# Patient Record
Sex: FEMALE | Race: WHITE | NOT HISPANIC OR LATINO | Employment: OTHER | URBAN - METROPOLITAN AREA
[De-identification: names, ages, dates, MRNs, and addresses within clinical notes are randomized per-mention and may not be internally consistent; named-entity substitution may affect disease eponyms.]

---

## 2023-10-11 ENCOUNTER — TELEPHONE (OUTPATIENT)
Dept: RHEUMATOLOGY | Facility: MEDICAL CENTER | Age: 78
End: 2023-10-11

## 2024-03-25 ENCOUNTER — OFFICE VISIT (OUTPATIENT)
Dept: MEDICAL GROUP | Facility: PHYSICIAN GROUP | Age: 79
End: 2024-03-25
Payer: MEDICARE

## 2024-03-25 VITALS
OXYGEN SATURATION: 90 % | WEIGHT: 207.89 LBS | RESPIRATION RATE: 14 BRPM | TEMPERATURE: 97 F | HEART RATE: 71 BPM | DIASTOLIC BLOOD PRESSURE: 72 MMHG | HEIGHT: 70 IN | SYSTOLIC BLOOD PRESSURE: 106 MMHG | BODY MASS INDEX: 29.76 KG/M2

## 2024-03-25 DIAGNOSIS — E03.9 HYPOTHYROIDISM, UNSPECIFIED TYPE: ICD-10-CM

## 2024-03-25 DIAGNOSIS — S33.100A SUBLUXATION OF LUMBAR VERTEBRA, INITIAL ENCOUNTER: ICD-10-CM

## 2024-03-25 DIAGNOSIS — Z85.828 HX OF NONMELANOMA SKIN CANCER: ICD-10-CM

## 2024-03-25 DIAGNOSIS — M54.42 ACUTE LEFT-SIDED LOW BACK PAIN WITH LEFT-SIDED SCIATICA: ICD-10-CM

## 2024-03-25 DIAGNOSIS — K64.8 OTHER HEMORRHOIDS: ICD-10-CM

## 2024-03-25 DIAGNOSIS — E55.9 VITAMIN D DEFICIENCY: ICD-10-CM

## 2024-03-25 DIAGNOSIS — I50.32 CHRONIC HEART FAILURE WITH PRESERVED EJECTION FRACTION (HCC): ICD-10-CM

## 2024-03-25 DIAGNOSIS — M16.12 ARTHRITIS OF LEFT HIP: ICD-10-CM

## 2024-03-25 DIAGNOSIS — Z23 NEED FOR VACCINATION: ICD-10-CM

## 2024-03-25 DIAGNOSIS — Z78.0 POST-MENOPAUSAL: ICD-10-CM

## 2024-03-25 DIAGNOSIS — M25.552 POSTERIOR PAIN OF LEFT HIP: ICD-10-CM

## 2024-03-25 DIAGNOSIS — I10 HYPERTENSION: ICD-10-CM

## 2024-03-25 DIAGNOSIS — E11.9 TYPE 2 DIABETES MELLITUS WITHOUT COMPLICATION, WITHOUT LONG-TERM CURRENT USE OF INSULIN (HCC): ICD-10-CM

## 2024-03-25 DIAGNOSIS — E78.5 HYPERLIPIDEMIA: ICD-10-CM

## 2024-03-25 PROCEDURE — 90677 PCV20 VACCINE IM: CPT | Performed by: STUDENT IN AN ORGANIZED HEALTH CARE EDUCATION/TRAINING PROGRAM

## 2024-03-25 PROCEDURE — 90662 IIV NO PRSV INCREASED AG IM: CPT | Performed by: STUDENT IN AN ORGANIZED HEALTH CARE EDUCATION/TRAINING PROGRAM

## 2024-03-25 PROCEDURE — G0009 ADMIN PNEUMOCOCCAL VACCINE: HCPCS | Performed by: STUDENT IN AN ORGANIZED HEALTH CARE EDUCATION/TRAINING PROGRAM

## 2024-03-25 PROCEDURE — 3074F SYST BP LT 130 MM HG: CPT | Performed by: STUDENT IN AN ORGANIZED HEALTH CARE EDUCATION/TRAINING PROGRAM

## 2024-03-25 PROCEDURE — 99204 OFFICE O/P NEW MOD 45 MIN: CPT | Mod: 25 | Performed by: STUDENT IN AN ORGANIZED HEALTH CARE EDUCATION/TRAINING PROGRAM

## 2024-03-25 PROCEDURE — 3078F DIAST BP <80 MM HG: CPT | Performed by: STUDENT IN AN ORGANIZED HEALTH CARE EDUCATION/TRAINING PROGRAM

## 2024-03-25 PROCEDURE — G0008 ADMIN INFLUENZA VIRUS VAC: HCPCS | Performed by: STUDENT IN AN ORGANIZED HEALTH CARE EDUCATION/TRAINING PROGRAM

## 2024-03-25 RX ORDER — KETOCONAZOLE 20 MG/G
1 CREAM TOPICAL DAILY
COMMUNITY
Start: 2023-10-31

## 2024-03-25 RX ORDER — SPIRONOLACTONE 25 MG/1
12.5 TABLET ORAL DAILY
Qty: 90 TABLET | Refills: 3 | Status: SHIPPED | OUTPATIENT
Start: 2024-03-25

## 2024-03-25 RX ORDER — CLOBETASOL PROPIONATE 0.46 MG/ML
SOLUTION TOPICAL
COMMUNITY
Start: 2023-11-29 | End: 2024-03-25

## 2024-03-25 RX ORDER — LEVOTHYROXINE SODIUM 0.1 MG/1
100 TABLET ORAL DAILY
COMMUNITY
Start: 2024-03-25

## 2024-03-25 RX ORDER — ANTIOX #8/OM3/DHA/EPA/LUT/ZEAX 250-2.5 MG
2 CAPSULE ORAL DAILY
COMMUNITY

## 2024-03-25 RX ORDER — MINOCYCLINE HYDROCHLORIDE 50 MG/1
50 CAPSULE ORAL 2 TIMES DAILY
COMMUNITY
Start: 2024-01-16 | End: 2024-03-25

## 2024-03-25 RX ORDER — GLYBURIDE 1.25 MG/1
1.25 TABLET ORAL
COMMUNITY
Start: 2024-01-04 | End: 2025-01-03

## 2024-03-25 RX ORDER — POLYETHYLENE GLYCOL 3350 17 G/17G
17 POWDER, FOR SOLUTION ORAL DAILY
Qty: 72 EACH | Refills: 3 | Status: SHIPPED | OUTPATIENT
Start: 2024-03-25

## 2024-03-25 RX ORDER — PRAVASTATIN SODIUM 80 MG/1
80 TABLET ORAL DAILY
Qty: 100 TABLET | Refills: 1 | Status: SHIPPED | OUTPATIENT
Start: 2024-03-25

## 2024-03-25 RX ORDER — AMPICILLIN TRIHYDRATE 250 MG
500 CAPSULE ORAL DAILY
COMMUNITY

## 2024-03-25 ASSESSMENT — PATIENT HEALTH QUESTIONNAIRE - PHQ9: CLINICAL INTERPRETATION OF PHQ2 SCORE: 0

## 2024-03-25 NOTE — PROGRESS NOTES
Verbal Consent given for [employer] recording software    HISTORY OF PRESENT ILLNESS: Elissa is a pleasant 78 y.o. female, new  patient who presents today to discuss medical problems as listed below:    History of Present Illness  The patient is an 78-year-old female who is here to establish care.    . She moved here to [place] after her   on 02/10/2024. Her son lives in [place] and her daughter who lives in [facility] brought her today. She could not get into anybody in [place] for 3 months.    She was taking care of her  and all of a sudden, she started having severe pain in her hip . It started in her lower back and then went across her hip in a straight line. It then went down her leg and even through her ankles, but it has backed off from that. It has been mainly in her knees. It started about 2 months ago. She has a little bit of tingling when she bends forward. She does not kneel down at night because she has artificial knees. She has had loss of bladder and bowel control for the last 10 years, but it has been a little worse than it was. She wears a pad for protection for the last 15 years. She denies any fevers, nausea, or vomiting. She has been taking ibuprofen 3 times a day, which is the only thing that would cut the pain. She has not gone to the urgent care or the emergency room for this pain. She has not felt anything inside the hip joint. She fell out of bed in 2023. She does not think the pain is related to the fall. She did take a little bit of Tylenol, but she did not feel like it gave her the relief that the ibuprofen did. Before her  got sick, she was going to the pool and doing aerobic rib exercises 3 times a week.    She is taking glipizide. Her last A1c was down to 6.3 about 6 months ago. She checks her blood every day. It has been as low as 65 and 107, but usually it is around 130 to 140. She is not taking metformin.    She is taking levothyroxine 100 mcg a day.    She is  taking spironolactone 12.5 mg daily. She is not taking lisinopril. She denies any chest pain, shortness of breath, or swelling in her legs.    She is taking pravastatin 80 mg.    She is taking aspirin. She was told that her blood was too thick, so she was put on 2 of them.    She has hemorrhoids. She has been using Preparation H for the last 1m, but it is still pretty big. This is the first time she has had it in her whole life. She has had it for about a month. She is more constipated than ususal      She went to a nephrologist for her kidneys. She was told that her kidneys function was 50 percent. She was told that for an 78-year-old woman, that is normal. She was told that taking spironolactone could hurt her kidneys, but if she drinks a lot of water, it would not hurt. She is taking half a tablet.    Supplemental Information  She had skin cancer on her forehead that was removed. She had 2 skin precancerous moles rooted off on her back. She thinks she has 2 more right now. She has been seeing a cardiologist in [place]. When she first went to him, her heart was fibrillating. She has not had any fibrillations for quite a while. One time, she saw Dr. Daily, who took her completely off the spironolactone. She went home and had fibrillating 3 times that week. She went back on the spironolactone and it stopped. She sees a cardiologist once a year.   She grew up in [place], [place].   She is allergic to EGG WHITE.   She has not received her pneumonia, influenza, or shingles vaccines.       Current Outpatient Medications Ordered in Epic   Medication Sig Dispense Refill    Multiple Vitamins-Minerals (PRESERVISION AREDS 2) Cap Take 2 Capsules by mouth every day.      glyBURIDE (DIABETA) 1.25 MG tablet Take 1.25 mg by mouth.      ketoconazole (NIZORAL) 2 % Cream Apply 1 Application topically every day.      Cinnamon 500 MG Cap Take 500 mg by mouth every day.      levothyroxine (SYNTHROID) 100 MCG Tab Take 1 Tablet by  mouth every day.        pravastatin (PRAVACHOL) 80 MG tablet Take 1 Tablet by mouth every day. 100 Tablet 1    spironolactone (ALDACTONE) 25 MG Tab Take 0.5 Tablets by mouth every day. 90 Tablet 3    polyethylene glycol/lytes (MIRALAX) Pack Take 1 Packet by mouth every day. For constipation 72 Each 3    psyllium 25 % packet Take 1 Packet by mouth 2 times a day. 90 Each 1    oyster shell calcium (CALCIUM CARBONATE) 500 MG TABS Take 500 mg by mouth every day.        Daily Multiple Vitamins TABS Take 1 Tab by mouth every day.        docosahexanoic acid (FISH OIL) 1000 MG CAPS Take 1,000 mg by mouth 3 times a day, with meals.        vitamin D (CHOLECALCIFEROL) 1000 UNIT TABS Take  by mouth. TAKE AS DIRECTED.        No current Epic-ordered facility-administered medications on file.       Review of systems:  Per HPI    Patient Active Problem List    Diagnosis Date Noted    Chronic heart failure with preserved ejection fraction (HCC) 03/26/2024    Acute left-sided low back pain with left-sided sciatica 03/25/2024    Type 2 diabetes mellitus without complication, without long-term current use of insulin (HCC) 03/25/2024    Other hemorrhoids 03/25/2024    Hx of nonmelanoma skin cancer 03/25/2024    Cough 05/10/2012    Dyslipidemia 05/10/2012    HTN (hypertension) 05/10/2012    Palpitations 05/10/2012    Simple goiter 02/06/2012    Arthritis 11/23/2011    Exudative macular degeneration (HCC) 11/23/2011    Obesity 11/23/2011     Past Surgical History:   Procedure Laterality Date    HIP REPLACEMENT, TOTAL      HYSTERECTOMY, TOTAL ABDOMINAL      KNEE REPLACEMENT, TOTAL      OTHER      LAPAROSC SM INTEST LASER VAPORIZATION ENDOMETRIOTIC TISSUE.     Social History     Tobacco Use    Smoking status: Never    Smokeless tobacco: Never   Vaping Use    Vaping Use: Never used   Substance Use Topics    Alcohol use: Never      Family History   Problem Relation Age of Onset    Stroke Other      Current Outpatient Medications   Medication  "Sig Dispense Refill    Multiple Vitamins-Minerals (PRESERVISION AREDS 2) Cap Take 2 Capsules by mouth every day.      glyBURIDE (DIABETA) 1.25 MG tablet Take 1.25 mg by mouth.      ketoconazole (NIZORAL) 2 % Cream Apply 1 Application topically every day.      Cinnamon 500 MG Cap Take 500 mg by mouth every day.      levothyroxine (SYNTHROID) 100 MCG Tab Take 1 Tablet by mouth every day.        pravastatin (PRAVACHOL) 80 MG tablet Take 1 Tablet by mouth every day. 100 Tablet 1    spironolactone (ALDACTONE) 25 MG Tab Take 0.5 Tablets by mouth every day. 90 Tablet 3    polyethylene glycol/lytes (MIRALAX) Pack Take 1 Packet by mouth every day. For constipation 72 Each 3    psyllium 25 % packet Take 1 Packet by mouth 2 times a day. 90 Each 1    oyster shell calcium (CALCIUM CARBONATE) 500 MG TABS Take 500 mg by mouth every day.        Daily Multiple Vitamins TABS Take 1 Tab by mouth every day.        docosahexanoic acid (FISH OIL) 1000 MG CAPS Take 1,000 mg by mouth 3 times a day, with meals.        vitamin D (CHOLECALCIFEROL) 1000 UNIT TABS Take  by mouth. TAKE AS DIRECTED.        No current facility-administered medications for this visit.       Allergies:  Allergies   Allergen Reactions    Pcn [Penicillins]        Allergies, past medical history, past surgical history, family history, social history reviewed and updated.    Objective:    /72   Pulse 71   Temp 36.1 °C (97 °F) (Temporal)   Resp 14   Ht 1.778 m (5' 10\")   Wt 94.3 kg (207 lb 14.3 oz)   SpO2 90%   BMI 29.83 kg/m²    Body mass index is 29.83 kg/m².    Physical exam:  General: Normal appearance, no acute distress, not ill-appearing  HEENT: EOM intact, conjunctiva normal limits, negative right/left eye discharge.  Sclera anicteric  Cardiovascular: Normal rate and rhythm, no murmurs  Pulmonary: No respiratory distress, no wheezing, no rales, breath sounds normal.  Musculoskeletal: No edema bilaterally  L hip neg erum, neg fader test. Good hip " flexion.   Skin: Warm, dry, no lesions  Neurological: No focal deficits, normal gait  Psychiatric: Mood within normal limits    Assessment/Plan:    Assessment & Plan  1. Sciatica.  Hip exam wnl though she does complain of posterior hip pain I suspect this is pain from her lower back.   I will get x-rays.   Advised to dc ibuprofen as she is on spironolactone, asa. Advised to take tylenol   Referral for pt placed     2. Type 2 diabetes.  She is on glyburide 1.25 mg daily.   Labs ordered    3. Hypothyroidism.  She will continue levothyroxine 100 mcg.    4. Hypertension.  She is on spironolactone 12.5 mg daily. She will continue spironolactone.  Chronic stable cond    5. High cholesterol.  She will continue pravastatin 80 mg.    6. Hemorrhoids.  Advised on avoiding pressure, constipation  Rx metamucil and stool softener for constipation      7. Seasonal allergies.  She probably has seasonal allergies. I will prescribe a temporary medication.    8. Skin cancer.  I will refer her to a dermatologist.    9. Chronic heart failure.  She saw a cardiologist in [place] who said she has chronic heart failure.   I advised her to stop the asa for now for primary prevention    10. Health maintenance.  She will receive her pneumonia and influenza vaccines today. She will receive her shingles vaccine. I will order a bone density scan.    Follow-up  The patient will follow up in 1 month.       Problem List Items Addressed This Visit       Acute left-sided low back pain with left-sided sciatica    Relevant Orders    DX-LUMBAR SPINE-2 OR 3 VIEWS    Referral to Physical Therapy    Type 2 diabetes mellitus without complication, without long-term current use of insulin (HCC)    Relevant Medications    glyBURIDE (DIABETA) 1.25 MG tablet    Other Relevant Orders    HEMOGLOBIN A1C    Lipid Profile    MICROALBUMIN CREAT RATIO URINE    CBC WITHOUT DIFFERENTIAL    Comp Metabolic Panel    Other hemorrhoids    Relevant Medications    pravastatin  (PRAVACHOL) 80 MG tablet    spironolactone (ALDACTONE) 25 MG Tab    psyllium 25 % packet    Hx of nonmelanoma skin cancer    Relevant Orders    Referral to Dermatology    Chronic heart failure with preserved ejection fraction (HCC)     Per last cardiology note 1/24:  Echocardiogram December 2022    Interpretation Summary   Left ventricular size, shape, and systolic function appear to be within normal   limits.   There is mild concentric left ventricular hypertrophy by 2-D.   The left ventricular wall motion is normal.   Ejection fraction is visually estimated at 60-65%, consistent with normal   global systolic function.   No aortic regurgitation is present.   There is no mitral regurgitation noted.   No tricuspid regurgitation.   Pulmonary artery systolic pressure cannot be estimated from the available   tricuspid regurgitation Doppler.   Mildly dilated ascending aorta.   Compared to previous echocardiogram no significant change     Palpitations   Holter monitor June 2019.  Underlying rhythm was sinus rhythm with an average heart rate of 64 bpm, minimum of 48 bpm, maximum 108 bpm. There was PACs comprising 0.4% of total beats. There was no SVT. There was 1 beat of atrial tachycardia of 6 beats. 2 PVCs.          Relevant Medications    pravastatin (PRAVACHOL) 80 MG tablet    spironolactone (ALDACTONE) 25 MG Tab    Other Relevant Orders    REFERRAL TO CARDIOLOGY     Other Visit Diagnoses       Need for vaccination        Relevant Orders    INFLUENZA VACCINE, HIGH DOSE (65+ ONLY) (Completed)    Pneumococcal Conjugate Vaccine 20-Valent (6 wks+) (Completed)    Posterior pain of left hip        Relevant Orders    DX-HIP-UNILATERAL-W/O PELVIS-2/3 VIEWS LEFT    Referral to Physical Therapy    Hyperlipidemia        Relevant Medications    pravastatin (PRAVACHOL) 80 MG tablet    spironolactone (ALDACTONE) 25 MG Tab    Other Relevant Orders    Lipid Profile    Hypertension        Relevant Medications    pravastatin (PRAVACHOL)  80 MG tablet    spironolactone (ALDACTONE) 25 MG Tab    Vitamin D deficiency        Relevant Orders    VITAMIN D,25 HYDROXY (DEFICIENCY)    Hypothyroidism, unspecified type        Relevant Medications    levothyroxine (SYNTHROID) 100 MCG Tab    Other Relevant Orders    TSH    FREE THYROXINE    Post-menopausal        Relevant Orders    DS-BONE DENSITY STUDY (DEXA)          Patient here for a preventive medicine visit today and to establish care.  -Reviewed all past medical history, family history, social history    Return in about 4 weeks (around 4/22/2024) for symptoms.

## 2024-03-25 NOTE — LETTER
[employer]  Som White D.O.  [address]  [place] [address]  Fax: [phone]   Authorization for Release/Disclosure of   Protected Health Information   Name: KIMBERLY SONG : 1945 SSN: [id number]   Address: [address] [place] [address]   [place] [address] Phone:    [phone] (home)    I authorize the entity listed below to release/disclose the PHI below to:   [facility]/Som White D.O. and Som White D.O.   Provider or Entity Name:  [facility]   Address   City, State, [facility] Phone:      Fax:     Reason for request: continuity of care   Information to be released:    [  ] LAST COLONOSCOPY,  including any PATH REPORT and follow-up  [  ] LAST FIT/COLOGUARD RESULT [  ] LAST DEXA  [  ] LAST MAMMOGRAM  [  ] LAST PAP  [  ] LAST LABS [  ] RETINA EXAM REPORT  [  ] IMMUNIZATION RECORDS  [  x] Release all info      [  ] Check here and initial the line next to each item to release ALL health information INCLUDING  _____ Care and treatment for drug and / or alcohol abuse  _____ HIV testing, infection status, or AIDS  _____ Genetic Testing    DATES OF SERVICE OR TIME PERIOD TO BE DISCLOSED: _____________  I understand and acknowledge that:  * This Authorization may be revoked at any time by you in writing, except if your health information has already been used or disclosed.  * Your health information that will be used or disclosed as a result of you signing this authorization could be re-disclosed by the recipient. If this occurs, your re-disclosed health information may no longer be protected by State or Federal laws.  * You may refuse to sign this Authorization. Your refusal will not affect your ability to obtain treatment.  * This Authorization becomes effective upon signing and will  on (date) __________.      If no date is indicated, this Authorization will  one (1) year from the signature date.    Name: Kimberly Song  Signature: Date:   3/25/2024     PLEASE FAX REQUESTED  RECORDS BACK TO: [phone]

## 2024-03-26 ENCOUNTER — HOSPITAL ENCOUNTER (OUTPATIENT)
Dept: RADIOLOGY | Facility: MEDICAL CENTER | Age: 79
End: 2024-03-26
Attending: STUDENT IN AN ORGANIZED HEALTH CARE EDUCATION/TRAINING PROGRAM
Payer: MEDICARE

## 2024-03-26 DIAGNOSIS — M54.42 ACUTE LEFT-SIDED LOW BACK PAIN WITH LEFT-SIDED SCIATICA: ICD-10-CM

## 2024-03-26 DIAGNOSIS — M25.552 POSTERIOR PAIN OF LEFT HIP: ICD-10-CM

## 2024-03-26 PROBLEM — I50.32 CHRONIC HEART FAILURE WITH PRESERVED EJECTION FRACTION (HCC): Status: ACTIVE | Noted: 2024-03-26

## 2024-03-26 PROCEDURE — 72100 X-RAY EXAM L-S SPINE 2/3 VWS: CPT

## 2024-03-26 PROCEDURE — 73502 X-RAY EXAM HIP UNI 2-3 VIEWS: CPT | Mod: LT

## 2024-03-26 NOTE — ASSESSMENT & PLAN NOTE
Per last cardiology note 1/24:  Echocardiogram December 2022    Interpretation Summary   Left ventricular size, shape, and systolic function appear to be within normal   limits.   There is mild concentric left ventricular hypertrophy by 2-D.   The left ventricular wall motion is normal.   Ejection fraction is visually estimated at 60-65%, consistent with normal   global systolic function.   No aortic regurgitation is present.   There is no mitral regurgitation noted.   No tricuspid regurgitation.   Pulmonary artery systolic pressure cannot be estimated from the available   tricuspid regurgitation Doppler.   Mildly dilated ascending aorta.   Compared to previous echocardiogram no significant change     Palpitations   Holter monitor June 2019.  Underlying rhythm was sinus rhythm with an average heart rate of 64 bpm, minimum of 48 bpm, maximum 108 bpm. There was PACs comprising 0.4% of total beats. There was no SVT. There was 1 beat of atrial tachycardia of 6 beats. 2 PVCs.

## 2024-03-27 ENCOUNTER — HOSPITAL ENCOUNTER (OUTPATIENT)
Dept: LAB | Facility: MEDICAL CENTER | Age: 79
End: 2024-03-27
Attending: STUDENT IN AN ORGANIZED HEALTH CARE EDUCATION/TRAINING PROGRAM
Payer: MEDICARE

## 2024-03-27 DIAGNOSIS — E11.9 TYPE 2 DIABETES MELLITUS WITHOUT COMPLICATION, WITHOUT LONG-TERM CURRENT USE OF INSULIN (HCC): ICD-10-CM

## 2024-03-27 DIAGNOSIS — E03.9 HYPOTHYROIDISM, UNSPECIFIED TYPE: ICD-10-CM

## 2024-03-27 DIAGNOSIS — E78.5 HYPERLIPIDEMIA: ICD-10-CM

## 2024-03-27 DIAGNOSIS — E55.9 VITAMIN D DEFICIENCY: ICD-10-CM

## 2024-03-27 LAB
25(OH)D3 SERPL-MCNC: 43 NG/ML (ref 30–100)
ALBUMIN SERPL BCP-MCNC: 4.2 G/DL (ref 3.2–4.9)
ALBUMIN/GLOB SERPL: 1.7 G/DL
ALP SERPL-CCNC: 47 U/L (ref 30–99)
ALT SERPL-CCNC: 18 U/L (ref 2–50)
ANION GAP SERPL CALC-SCNC: 12 MMOL/L (ref 7–16)
AST SERPL-CCNC: 21 U/L (ref 12–45)
BILIRUB SERPL-MCNC: 0.4 MG/DL (ref 0.1–1.5)
BUN SERPL-MCNC: 26 MG/DL (ref 8–22)
CALCIUM ALBUM COR SERPL-MCNC: 8.9 MG/DL (ref 8.5–10.5)
CALCIUM SERPL-MCNC: 9.1 MG/DL (ref 8.5–10.5)
CHLORIDE SERPL-SCNC: 103 MMOL/L (ref 96–112)
CHOLEST SERPL-MCNC: 134 MG/DL (ref 100–199)
CO2 SERPL-SCNC: 25 MMOL/L (ref 20–33)
CREAT SERPL-MCNC: 1.06 MG/DL (ref 0.5–1.4)
CREAT UR-MCNC: 139.55 MG/DL
ERYTHROCYTE [DISTWIDTH] IN BLOOD BY AUTOMATED COUNT: 48.9 FL (ref 35.9–50)
EST. AVERAGE GLUCOSE BLD GHB EST-MCNC: 134 MG/DL
FASTING STATUS PATIENT QL REPORTED: NORMAL
GFR SERPLBLD CREATININE-BSD FMLA CKD-EPI: 53 ML/MIN/1.73 M 2
GLOBULIN SER CALC-MCNC: 2.5 G/DL (ref 1.9–3.5)
GLUCOSE SERPL-MCNC: 132 MG/DL (ref 65–99)
HBA1C MFR BLD: 6.3 % (ref 4–5.6)
HCT VFR BLD AUTO: 45.7 % (ref 37–47)
HDLC SERPL-MCNC: 56 MG/DL
HGB BLD-MCNC: 15.3 G/DL (ref 12–16)
LDLC SERPL CALC-MCNC: 66 MG/DL
MCH RBC QN AUTO: 32.2 PG (ref 27–33)
MCHC RBC AUTO-ENTMCNC: 33.5 G/DL (ref 32.2–35.5)
MCV RBC AUTO: 96.2 FL (ref 81.4–97.8)
MICROALBUMIN UR-MCNC: <1.2 MG/DL
MICROALBUMIN/CREAT UR: NORMAL MG/G (ref 0–30)
PLATELET # BLD AUTO: 292 K/UL (ref 164–446)
PMV BLD AUTO: 10.7 FL (ref 9–12.9)
POTASSIUM SERPL-SCNC: 4 MMOL/L (ref 3.6–5.5)
PROT SERPL-MCNC: 6.7 G/DL (ref 6–8.2)
RBC # BLD AUTO: 4.75 M/UL (ref 4.2–5.4)
SODIUM SERPL-SCNC: 140 MMOL/L (ref 135–145)
T4 FREE SERPL-MCNC: 1.37 NG/DL (ref 0.93–1.7)
TRIGL SERPL-MCNC: 62 MG/DL (ref 0–149)
TSH SERPL DL<=0.005 MIU/L-ACNC: 1.71 UIU/ML (ref 0.38–5.33)
WBC # BLD AUTO: 7.5 K/UL (ref 4.8–10.8)

## 2024-03-27 PROCEDURE — 82043 UR ALBUMIN QUANTITATIVE: CPT

## 2024-03-27 PROCEDURE — 80061 LIPID PANEL: CPT

## 2024-03-27 PROCEDURE — 36415 COLL VENOUS BLD VENIPUNCTURE: CPT

## 2024-03-27 PROCEDURE — 82306 VITAMIN D 25 HYDROXY: CPT

## 2024-03-27 PROCEDURE — 82570 ASSAY OF URINE CREATININE: CPT

## 2024-03-27 PROCEDURE — 80053 COMPREHEN METABOLIC PANEL: CPT

## 2024-03-27 PROCEDURE — 84439 ASSAY OF FREE THYROXINE: CPT

## 2024-03-27 PROCEDURE — 83036 HEMOGLOBIN GLYCOSYLATED A1C: CPT | Mod: GA

## 2024-03-27 PROCEDURE — 85027 COMPLETE CBC AUTOMATED: CPT

## 2024-03-27 PROCEDURE — 84443 ASSAY THYROID STIM HORMONE: CPT

## 2024-04-10 ENCOUNTER — HOSPITAL ENCOUNTER (OUTPATIENT)
Dept: RADIOLOGY | Facility: MEDICAL CENTER | Age: 79
End: 2024-04-10
Attending: STUDENT IN AN ORGANIZED HEALTH CARE EDUCATION/TRAINING PROGRAM
Payer: MEDICARE

## 2024-04-10 DIAGNOSIS — Z78.0 POST-MENOPAUSAL: ICD-10-CM

## 2024-04-10 PROCEDURE — 77080 DXA BONE DENSITY AXIAL: CPT

## 2024-04-15 ENCOUNTER — HOSPITAL ENCOUNTER (OUTPATIENT)
Dept: RADIOLOGY | Facility: MEDICAL CENTER | Age: 79
End: 2024-04-15
Payer: MEDICARE

## 2024-04-15 ENCOUNTER — OFFICE VISIT (OUTPATIENT)
Dept: URGENT CARE | Facility: PHYSICIAN GROUP | Age: 79
End: 2024-04-15
Payer: MEDICARE

## 2024-04-15 VITALS
WEIGHT: 210 LBS | RESPIRATION RATE: 14 BRPM | HEIGHT: 70 IN | DIASTOLIC BLOOD PRESSURE: 76 MMHG | OXYGEN SATURATION: 90 % | BODY MASS INDEX: 30.06 KG/M2 | SYSTOLIC BLOOD PRESSURE: 112 MMHG | TEMPERATURE: 98.6 F | HEART RATE: 94 BPM

## 2024-04-15 DIAGNOSIS — B96.89 ACUTE BACTERIAL SINUSITIS: ICD-10-CM

## 2024-04-15 DIAGNOSIS — R05.3 CHRONIC COUGH: ICD-10-CM

## 2024-04-15 DIAGNOSIS — J01.90 ACUTE BACTERIAL SINUSITIS: ICD-10-CM

## 2024-04-15 PROCEDURE — 3078F DIAST BP <80 MM HG: CPT

## 2024-04-15 PROCEDURE — 3074F SYST BP LT 130 MM HG: CPT

## 2024-04-15 PROCEDURE — 99213 OFFICE O/P EST LOW 20 MIN: CPT

## 2024-04-15 PROCEDURE — 71046 X-RAY EXAM CHEST 2 VIEWS: CPT

## 2024-04-15 RX ORDER — DOXYCYCLINE HYCLATE 100 MG
100 TABLET ORAL 2 TIMES DAILY
Qty: 14 TABLET | Refills: 0 | Status: SHIPPED | OUTPATIENT
Start: 2024-04-15 | End: 2024-04-22

## 2024-04-15 ASSESSMENT — FIBROSIS 4 INDEX: FIB4 SCORE: 1.36

## 2024-04-16 ASSESSMENT — ENCOUNTER SYMPTOMS
COUGH: 1
VOMITING: 0
NAUSEA: 0
CHILLS: 0
DIARRHEA: 0
PALPITATIONS: 0
SHORTNESS OF BREATH: 0
SPUTUM PRODUCTION: 1
FEVER: 0
ORTHOPNEA: 0
HEADACHES: 0

## 2024-04-16 NOTE — PROGRESS NOTES
CHIEF COMPLAINT  Chief Complaint   Patient presents with    Sinusitis     Cough and mucus      Subjective:   Elissa Baldwin is a 78 y.o. female who presents with 2-month history of persistent cough and sinus pressure.  Patient reports 2-month history of productive nasal congestion.  She does report that cough is productive with a green to yellow type mucus.  She also reports associated symptoms of mild sore throat at illness onset which did resolve after gargling hydrogen peroxide.  Patient reports use of Claritin, Benadryl, NyQuil for alleviation of congestion.  She denies any symptoms of shortness of breath, but coughing fits often cause her to feel slightly fatigued.  Patient denies any symptoms of fever or chills.  She denies any other pertinent past medical history.      Review of Systems   Constitutional:  Negative for chills and fever.   HENT:  Positive for congestion.    Respiratory:  Positive for cough and sputum production. Negative for shortness of breath.    Cardiovascular:  Negative for chest pain, palpitations and orthopnea.   Gastrointestinal:  Negative for diarrhea, nausea and vomiting.   Neurological:  Negative for headaches.       PAST MEDICAL HISTORY  Patient Active Problem List    Diagnosis Date Noted    Chronic heart failure with preserved ejection fraction (HCC) 03/26/2024    Acute left-sided low back pain with left-sided sciatica 03/25/2024    Type 2 diabetes mellitus without complication, without long-term current use of insulin (HCC) 03/25/2024    Other hemorrhoids 03/25/2024    Hx of nonmelanoma skin cancer 03/25/2024    Cough 05/10/2012    Dyslipidemia 05/10/2012    HTN (hypertension) 05/10/2012    Palpitations 05/10/2012    Simple goiter 02/06/2012    Arthritis 11/23/2011    Exudative macular degeneration (HCC) 11/23/2011    Obesity 11/23/2011       SURGICAL HISTORY   has a past surgical history that includes hysterectomy, total abdominal; knee replacement, total; hip replacement,  "total; and other.    ALLERGIES  Allergies   Allergen Reactions    Pcn [Penicillins]        CURRENT MEDICATIONS  Home Medications    **Home medications have not yet been reviewed for this encounter**         SOCIAL HISTORY  Social History     Tobacco Use    Smoking status: Never    Smokeless tobacco: Never   Vaping Use    Vaping Use: Never used   Substance and Sexual Activity    Alcohol use: Never    Drug use: Never    Sexual activity: Not on file       FAMILY HISTORY  Family History   Problem Relation Age of Onset    Stroke Other          Medications, Allergies, and current problem list reviewed today in Epic.     Objective:     /76   Pulse 94   Temp 37 °C (98.6 °F)   Resp 14   Ht 1.778 m (5' 10\")   Wt 95.3 kg (210 lb)   SpO2 90%     Physical Exam  Vitals reviewed.   Constitutional:       General: She is not in acute distress.     Appearance: Normal appearance. She is not ill-appearing or toxic-appearing.   HENT:      Head: Normocephalic.      Right Ear: Tympanic membrane normal.      Left Ear: Tympanic membrane normal.      Nose: Congestion present.      Mouth/Throat:      Mouth: Mucous membranes are moist.      Pharynx: Oropharynx is clear. No oropharyngeal exudate or posterior oropharyngeal erythema.   Cardiovascular:      Rate and Rhythm: Normal rate and regular rhythm.      Pulses: Normal pulses.      Heart sounds: Normal heart sounds.   Pulmonary:      Effort: Pulmonary effort is normal. No respiratory distress.      Breath sounds: Normal breath sounds. No stridor. No wheezing, rhonchi or rales.   Musculoskeletal:      Cervical back: Normal range of motion. No rigidity or tenderness.   Lymphadenopathy:      Cervical: No cervical adenopathy.   Skin:     General: Skin is warm.      Capillary Refill: Capillary refill takes less than 2 seconds.   Neurological:      General: No focal deficit present.      Mental Status: She is alert.   Psychiatric:         Mood and Affect: Mood normal.            "   Assessment/Plan:     Diagnosis and associated orders:     1. Chronic cough  DX-CHEST-2 VIEWS      2. Acute bacterial sinusitis  doxycycline (VIBRAMYCIN) 100 MG Tab         Comments/MDM:     Upon physical exam patient is alert and apparent signs of distress.  Bilateral TMs are normal.  Mild congestion appreciated.  Mucous membranes moist and clear.  No pharyngeal erythema or swelling.  Neck is supple, no lymphadenopathy.  Chest clear to auscultation bilaterally.  No crackles, rhonchi or wheezes appreciated.  Normal respiratory effort.  Vital signs are stable in clinic.  Chest x-ray performed in clinic today.  Discussed reassuring radiologic results.  X-ray showed no acute cardiopulmonary process.  Given patient reports of symptom duration as well as sinus pain and pressure and persistent nasal congestion discussed potential etiology of bacterial sinus infection.  Patient meets IDSA guidelines for ABRS given duration of symptoms, worsening severity, clinical history and physical exam  Continue antihistamine, nasal steroid, and OTC analgesics  No evidence of venous sinus thrombosis or more serious etiology  Typically these infections display a slow resolution of symptoms starting at 48-72 hours of treatment.  Mild pressure and pain may continue at end of week of antibiotics which is normal and continue the other supportive care until back to baseline.   Red flag signs and symptoms discussed.  Instructed to return to ER or urgent care if symptoms worsen or fail to improve.         Differential diagnosis, natural history, supportive care, and indications for immediate follow-up discussed.    Advised the patient to follow-up with the primary care physician for recheck, reevaluation, and consideration of further management.    Please note that this dictation was created using voice recognition software. I have made a reasonable attempt to correct obvious errors, but I expect that there are errors of grammar and possibly  content that I did not discover before finalizing the note.    This note was electronically signed by VICK Serra

## 2024-04-22 ENCOUNTER — TELEPHONE (OUTPATIENT)
Dept: HEALTH INFORMATION MANAGEMENT | Facility: OTHER | Age: 79
End: 2024-04-22
Payer: MEDICARE

## 2024-04-30 ENCOUNTER — APPOINTMENT (OUTPATIENT)
Dept: MEDICAL GROUP | Facility: PHYSICIAN GROUP | Age: 79
End: 2024-04-30
Payer: MEDICARE

## 2024-04-30 VITALS
SYSTOLIC BLOOD PRESSURE: 110 MMHG | HEART RATE: 67 BPM | RESPIRATION RATE: 14 BRPM | HEIGHT: 70 IN | TEMPERATURE: 98.7 F | BODY MASS INDEX: 30.08 KG/M2 | WEIGHT: 210.1 LBS | OXYGEN SATURATION: 93 % | DIASTOLIC BLOOD PRESSURE: 76 MMHG

## 2024-04-30 DIAGNOSIS — E11.9 TYPE 2 DIABETES MELLITUS WITHOUT COMPLICATION, WITHOUT LONG-TERM CURRENT USE OF INSULIN (HCC): ICD-10-CM

## 2024-04-30 DIAGNOSIS — N18.31 STAGE 3A CHRONIC KIDNEY DISEASE: ICD-10-CM

## 2024-04-30 PROCEDURE — 3074F SYST BP LT 130 MM HG: CPT | Performed by: STUDENT IN AN ORGANIZED HEALTH CARE EDUCATION/TRAINING PROGRAM

## 2024-04-30 PROCEDURE — 3078F DIAST BP <80 MM HG: CPT | Performed by: STUDENT IN AN ORGANIZED HEALTH CARE EDUCATION/TRAINING PROGRAM

## 2024-04-30 PROCEDURE — 99214 OFFICE O/P EST MOD 30 MIN: CPT | Performed by: STUDENT IN AN ORGANIZED HEALTH CARE EDUCATION/TRAINING PROGRAM

## 2024-04-30 RX ORDER — LISINOPRIL 20 MG/1
20 TABLET ORAL DAILY
COMMUNITY
End: 2024-04-30

## 2024-04-30 RX ORDER — FLUTICASONE PROPIONATE 50 MCG
SPRAY, SUSPENSION (ML) NASAL
COMMUNITY

## 2024-04-30 RX ORDER — GABAPENTIN 300 MG/1
CAPSULE ORAL
COMMUNITY

## 2024-04-30 RX ORDER — ASPIRIN 81 MG/1
162 TABLET ORAL DAILY
COMMUNITY

## 2024-04-30 RX ORDER — METFORMIN HYDROCHLORIDE 500 MG/1
500 TABLET, EXTENDED RELEASE ORAL DAILY
Qty: 90 TABLET | Refills: 1 | Status: SHIPPED | OUTPATIENT
Start: 2024-04-30 | End: 2024-05-02 | Stop reason: SDUPTHER

## 2024-04-30 RX ORDER — TRAMADOL HYDROCHLORIDE 50 MG/1
TABLET ORAL
COMMUNITY
Start: 2024-04-15

## 2024-04-30 ASSESSMENT — FIBROSIS 4 INDEX: FIB4 SCORE: 1.36

## 2024-04-30 NOTE — LETTER
[employer]  Som White D.O.  [address]  [place] [address]  Fax: [phone]   Authorization for Release/Disclosure of   Protected Health Information   Name: KIMBERLY SONG : 1945 SSN: [id number]   Address: [address] [place] [address] [place] [address] Phone:    [phone] (home)    I authorize the entity listed below to release/disclose the PHI below to:   [employer]/Som White D.O. and Som White D.O.   Provider or Entity Name:  [facility]   City, State, [facility] Phone:      Fax:     Reason for request: continuity of care   Information to be released:    [  ] LAST COLONOSCOPY,  including any PATH REPORT and follow-up  [  ] LAST FIT/COLOGUARD RESULT [  ] LAST DEXA  [  ] LAST MAMMOGRAM  [  ] LAST PAP  [  ] LAST LABS [ x ] RETINA EXAM REPORT  [  ] IMMUNIZATION RECORDS  [  ] Release all info      [  ] Check here and initial the line next to each item to release ALL health information INCLUDING  _____ Care and treatment for drug and / or alcohol abuse  _____ HIV testing, infection status, or AIDS  _____ Genetic Testing    DATES OF SERVICE OR TIME PERIOD TO BE DISCLOSED: _____________  I understand and acknowledge that:  * This Authorization may be revoked at any time by you in writing, except if your health information has already been used or disclosed.  * Your health information that will be used or disclosed as a result of you signing this authorization could be re-disclosed by the recipient. If this occurs, your re-disclosed health information may no longer be protected by State or Federal laws.  * You may refuse to sign this Authorization. Your refusal will not affect your ability to obtain treatment.  * This Authorization becomes effective upon signing and will  on (date) __________.      If no date is indicated, this Authorization will  one (1) year from the signature date.    Name: Kimberly Song  Signature: Date:   2024      PLEASE FAX REQUESTED RECORDS BACK TO: [phone]

## 2024-05-01 NOTE — PROGRESS NOTES
Verbal Consent given for [employer] recording software    HISTORY OF PRESENT ILLNESS: Elissa is a pleasant 78 y.o. female, established patient who presents today to discuss medical problems as listed below:    History of Present Illness  The patient is here for a follow-up. At the last visit, she was having a lot of left lower back and hip pain. We did a couple of upper x-rays.    The patient is scheduled for an MRI of her back and hipShe was referred to [facility] for [facility] therapy, however, her appointment is not until 07/2024.     Her daughter-in-law is contemplating water exercises for her. She finds relief from her pain when she is in a supine position. Her hip pain, which began in 01/2024, coincided with her caregiving responsibilities for her . 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 Her current pain management regimen includes 2 Tylenol tablets every 4 hours.    The patient is currently on glyburide 1.25 mg daily for her diabetes. She previously took metformin, but the reason for its discontinuation remains unknown to her.       Supplemental Information  She is having injections in her eyes at [facility].       Current Outpatient Medications Ordered in Epic   Medication Sig Dispense Refill    fluticasone (FLONASE) 50 MCG/ACT nasal spray       gabapentin (NEURONTIN) 300 MG Cap       metFORMIN ER (GLUCOPHAGE XR) 500 MG TABLET SR 24 HR Take 1 Tablet by mouth every day. 90 Tablet 1    Multiple Vitamins-Minerals (PRESERVISION AREDS 2) Cap Take 2 Capsules by mouth every day.      ketoconazole (NIZORAL) 2 % Cream Apply 1 Application topically every day.      Cinnamon 500 MG Cap Take 500 mg by mouth every day.      levothyroxine (SYNTHROID) 100 MCG Tab Take 1 Tablet by mouth every day.        pravastatin (PRAVACHOL) 80 MG tablet Take 1 Tablet by mouth every day. 100 Tablet 1    spironolactone (ALDACTONE) 25 MG Tab Take 0.5 Tablets by mouth every day. 90 Tablet 3    polyethylene  glycol/lytes (MIRALAX) Pack Take 1 Packet by mouth every day. For constipation 72 Each 3    psyllium 25 % packet Take 1 Packet by mouth 2 times a day. 90 Each 1    oyster shell calcium (CALCIUM CARBONATE) 500 MG TABS Take 500 mg by mouth every day.        Daily Multiple Vitamins TABS Take 1 Tab by mouth every day.        docosahexanoic acid (FISH OIL) 1000 MG CAPS Take 1,000 mg by mouth 3 times a day, with meals.        vitamin D (CHOLECALCIFEROL) 1000 UNIT TABS Take  by mouth. TAKE AS DIRECTED.       traMADol (ULTRAM) 50 MG Tab  (Patient not taking: Reported on 4/30/2024)      aspirin 81 MG EC tablet Take 162 mg by mouth every day. (Patient not taking: Reported on 4/30/2024)       No current [facility]-ordered facility-administered medications on file.       Review of systems:  Per HPI    Patient Active Problem List    Diagnosis Date Noted    Stage 3a chronic kidney disease 04/30/2024    Chronic heart failure with preserved ejection fraction (HCC) 03/26/2024    Acute left-sided low back pain with left-sided sciatica 03/25/2024    Type 2 diabetes mellitus without complication, without long-term current use of insulin (HCC) 03/25/2024    Other hemorrhoids 03/25/2024    Hx of nonmelanoma skin cancer 03/25/2024    Cough 05/10/2012    Dyslipidemia 05/10/2012    Palpitations 05/10/2012    Simple goiter 02/06/2012    Arthritis 11/23/2011    Exudative macular degeneration (HCC) 11/23/2011    Obesity 11/23/2011     Past Surgical History:   Procedure Laterality Date    HIP REPLACEMENT, TOTAL      HYSTERECTOMY, TOTAL ABDOMINAL      KNEE REPLACEMENT, TOTAL      OTHER      LAPAROSC SM INTEST LASER VAPORIZATION ENDOMETRIOTIC TISSUE.     Social History     Tobacco Use    Smoking status: Never    Smokeless tobacco: Never   Vaping Use    Vaping Use: Never used   Substance Use Topics    Alcohol use: Never    Drug use: Never      Family History   Problem Relation Age of Onset    Stroke Other      Current Outpatient Medications   Medication  "Sig Dispense Refill    fluticasone (FLONASE) 50 MCG/ACT nasal spray       gabapentin (NEURONTIN) 300 MG Cap       metFORMIN ER (GLUCOPHAGE XR) 500 MG TABLET SR 24 HR Take 1 Tablet by mouth every day. 90 Tablet 1    Multiple Vitamins-Minerals (PRESERVISION AREDS 2) Cap Take 2 Capsules by mouth every day.      ketoconazole (NIZORAL) 2 % Cream Apply 1 Application topically every day.      Cinnamon 500 MG Cap Take 500 mg by mouth every day.      levothyroxine (SYNTHROID) 100 MCG Tab Take 1 Tablet by mouth every day.        pravastatin (PRAVACHOL) 80 MG tablet Take 1 Tablet by mouth every day. 100 Tablet 1    spironolactone (ALDACTONE) 25 MG Tab Take 0.5 Tablets by mouth every day. 90 Tablet 3    polyethylene glycol/lytes (MIRALAX) Pack Take 1 Packet by mouth every day. For constipation 72 Each 3    psyllium 25 % packet Take 1 Packet by mouth 2 times a day. 90 Each 1    oyster shell calcium (CALCIUM CARBONATE) 500 MG TABS Take 500 mg by mouth every day.        Daily Multiple Vitamins TABS Take 1 Tab by mouth every day.        docosahexanoic acid (FISH OIL) 1000 MG CAPS Take 1,000 mg by mouth 3 times a day, with meals.        vitamin D (CHOLECALCIFEROL) 1000 UNIT TABS Take  by mouth. TAKE AS DIRECTED.       traMADol (ULTRAM) 50 MG Tab  (Patient not taking: Reported on 4/30/2024)      aspirin 81 MG EC tablet Take 162 mg by mouth every day. (Patient not taking: Reported on 4/30/2024)       No current facility-administered medications for this visit.       Allergies:  Allergies   Allergen Reactions    Pcn [Penicillins]        Allergies, past medical history, past surgical history, family history, social history reviewed and updated.    Objective:    /76   Pulse 67   Temp 37.1 °C (98.7 °F) (Temporal)   Resp 14   Ht 1.778 m (5' 10\")   Wt 95.3 kg (210 lb 1.6 oz)   SpO2 93%   BMI 30.15 kg/m²    Body mass index is 30.15 kg/m².    Physical exam:  General: Normal appearance, no acute distress, not ill-appearing  HEENT: " EOM intact, conjunctiva normal limits, negative right/left eye discharge.  Sclera anicteric  Cardiovascular: Normal rate and rhythm, no murmurs  Pulmonary: No respiratory distress, no wheezing, no rales, breath sounds normal.  Musculoskeletal: No edema bilaterally  Skin: Warm, dry, no lesions  Neurological: No focal deficits, normal gait  Psychiatric: Mood within normal limits    Monofilament testing with a 10 gram force: sensation intact: decreased bilaterally  Visual Inspection: Feet without maceration, ulcers, fissures.  Pedal pulses: intact bilaterally      Assessment/Plan:    Assessment & Plan  # Left hip arthritis  # Degenerative subluxations L3-L4, L4-L5  Patient following up with orthopedics  Will be getting MRI done, physical therapy to start in July.  Advised that she could find another physical therapy office informing to place referral if she would like to be seen earlier    2.  Type II diabetes.  The patient's condition is well-managed, with her A1c currently at 6.3. Given the risk of hypoglycemia associated with glyburide, it is preferable for the patient to commence metformin therapy.    DC glipizide start metformin    Diabetic foot exam today shows some monofilament sensation loss    3. Hypothyroidism.  The patient's TSH and free T4 levels are within the normal range.       Problem List Items Addressed This Visit       Type 2 diabetes mellitus without complication, without long-term current use of insulin (HCC)    Relevant Medications    metFORMIN ER (GLUCOPHAGE XR) 500 MG TABLET SR 24 HR    Other Relevant Orders    Diabetic Monofilament LE Exam (Completed)    Stage 3a chronic kidney disease     Gfr baseline 51-55               Return in about 3 months (around 7/30/2024), or if symptoms worsen or fail to improve.

## 2024-05-02 RX ORDER — METFORMIN HYDROCHLORIDE 500 MG/1
500 TABLET, EXTENDED RELEASE ORAL DAILY
Qty: 90 TABLET | Refills: 1 | Status: SHIPPED | OUTPATIENT
Start: 2024-05-02 | End: 2024-05-20 | Stop reason: SDUPTHER

## 2024-05-20 NOTE — TELEPHONE ENCOUNTER
Received request via: Patient    Was the patient seen in the last year in this department? Yes    Does the patient have an active prescription (recently filled or refills available) for medication(s) requested? No    Pharmacy Name: [facility]     Does the patient have [facility] and need 100 day supply (blood pressure, diabetes and cholesterol meds only)? Patient does not have SCP

## 2024-05-21 RX ORDER — METFORMIN HYDROCHLORIDE 500 MG/1
500 TABLET, EXTENDED RELEASE ORAL DAILY
Qty: 90 TABLET | Refills: 1 | Status: SHIPPED | OUTPATIENT
Start: 2024-05-21

## 2024-06-22 ENCOUNTER — HOSPITAL ENCOUNTER (OUTPATIENT)
Dept: LAB | Facility: MEDICAL CENTER | Age: 79
End: 2024-06-22
Attending: ORTHOPAEDIC SURGERY
Payer: MEDICARE

## 2024-06-22 LAB
ANION GAP SERPL CALC-SCNC: 12 MMOL/L (ref 7–16)
BASOPHILS # BLD AUTO: 0.7 % (ref 0–1.8)
BASOPHILS # BLD: 0.07 K/UL (ref 0–0.12)
BUN SERPL-MCNC: 23 MG/DL (ref 8–22)
CALCIUM SERPL-MCNC: 9.6 MG/DL (ref 8.4–10.2)
CHLORIDE SERPL-SCNC: 100 MMOL/L (ref 96–112)
CO2 SERPL-SCNC: 26 MMOL/L (ref 20–33)
CREAT SERPL-MCNC: 1.06 MG/DL (ref 0.5–1.4)
EOSINOPHIL # BLD AUTO: 0.19 K/UL (ref 0–0.51)
EOSINOPHIL NFR BLD: 2 % (ref 0–6.9)
ERYTHROCYTE [DISTWIDTH] IN BLOOD BY AUTOMATED COUNT: 47 FL (ref 35.9–50)
FASTING STATUS PATIENT QL REPORTED: NORMAL
GFR SERPLBLD CREATININE-BSD FMLA CKD-EPI: 53 ML/MIN/1.73 M 2
GLUCOSE SERPL-MCNC: 127 MG/DL (ref 65–99)
HCT VFR BLD AUTO: 49.5 % (ref 37–47)
HGB BLD-MCNC: 16.9 G/DL (ref 12–16)
IMM GRANULOCYTES # BLD AUTO: 0.04 K/UL (ref 0–0.11)
IMM GRANULOCYTES NFR BLD AUTO: 0.4 % (ref 0–0.9)
LYMPHOCYTES # BLD AUTO: 2.4 K/UL (ref 1–4.8)
LYMPHOCYTES NFR BLD: 25.2 % (ref 22–41)
MCH RBC QN AUTO: 32.6 PG (ref 27–33)
MCHC RBC AUTO-ENTMCNC: 34.1 G/DL (ref 32.2–35.5)
MCV RBC AUTO: 95.6 FL (ref 81.4–97.8)
MONOCYTES # BLD AUTO: 0.65 K/UL (ref 0–0.85)
MONOCYTES NFR BLD AUTO: 6.8 % (ref 0–13.4)
NEUTROPHILS # BLD AUTO: 6.18 K/UL (ref 1.82–7.42)
NEUTROPHILS NFR BLD: 64.9 % (ref 44–72)
NRBC # BLD AUTO: 0 K/UL
NRBC BLD-RTO: 0 /100 WBC (ref 0–0.2)
PLATELET # BLD AUTO: 289 K/UL (ref 164–446)
PMV BLD AUTO: 9.7 FL (ref 9–12.9)
POTASSIUM SERPL-SCNC: 4.2 MMOL/L (ref 3.6–5.5)
RBC # BLD AUTO: 5.18 M/UL (ref 4.2–5.4)
SODIUM SERPL-SCNC: 138 MMOL/L (ref 135–145)
WBC # BLD AUTO: 9.5 K/UL (ref 4.8–10.8)

## 2024-06-22 PROCEDURE — 83036 HEMOGLOBIN GLYCOSYLATED A1C: CPT | Mod: GA

## 2024-06-22 PROCEDURE — 85025 COMPLETE CBC W/AUTO DIFF WBC: CPT

## 2024-06-22 PROCEDURE — 36415 COLL VENOUS BLD VENIPUNCTURE: CPT

## 2024-06-22 PROCEDURE — 80048 BASIC METABOLIC PNL TOTAL CA: CPT

## 2024-06-23 LAB
EST. AVERAGE GLUCOSE BLD GHB EST-MCNC: 140 MG/DL
HBA1C MFR BLD: 6.5 % (ref 4–5.6)

## 2024-07-11 ENCOUNTER — APPOINTMENT (OUTPATIENT)
Dept: PHYSICAL THERAPY | Facility: REHABILITATION | Age: 79
End: 2024-07-11
Attending: STUDENT IN AN ORGANIZED HEALTH CARE EDUCATION/TRAINING PROGRAM
Payer: MEDICARE

## 2024-07-18 ENCOUNTER — APPOINTMENT (OUTPATIENT)
Dept: PHYSICAL THERAPY | Facility: REHABILITATION | Age: 79
End: 2024-07-18
Attending: STUDENT IN AN ORGANIZED HEALTH CARE EDUCATION/TRAINING PROGRAM
Payer: MEDICARE

## 2024-07-25 ENCOUNTER — APPOINTMENT (OUTPATIENT)
Dept: PHYSICAL THERAPY | Facility: REHABILITATION | Age: 79
End: 2024-07-25
Attending: STUDENT IN AN ORGANIZED HEALTH CARE EDUCATION/TRAINING PROGRAM
Payer: MEDICARE

## 2024-07-29 ENCOUNTER — HOSPITAL ENCOUNTER (OUTPATIENT)
Dept: RADIOLOGY | Facility: MEDICAL CENTER | Age: 79
End: 2024-07-29
Payer: MEDICARE

## 2024-07-29 ENCOUNTER — HOSPITAL ENCOUNTER (EMERGENCY)
Facility: MEDICAL CENTER | Age: 79
End: 2024-07-30
Payer: MEDICARE

## 2024-07-29 ENCOUNTER — HOSPITAL ENCOUNTER (EMERGENCY)
Facility: MEDICAL CENTER | Age: 79
End: 2024-07-29
Attending: EMERGENCY MEDICINE
Payer: MEDICARE

## 2024-07-29 ENCOUNTER — OFFICE VISIT (OUTPATIENT)
Dept: URGENT CARE | Facility: PHYSICIAN GROUP | Age: 79
End: 2024-07-29
Payer: MEDICARE

## 2024-07-29 VITALS
DIASTOLIC BLOOD PRESSURE: 64 MMHG | RESPIRATION RATE: 16 BRPM | SYSTOLIC BLOOD PRESSURE: 130 MMHG | TEMPERATURE: 99 F | BODY MASS INDEX: 29.98 KG/M2 | HEART RATE: 81 BPM | HEIGHT: 70 IN | OXYGEN SATURATION: 93 % | WEIGHT: 209.44 LBS

## 2024-07-29 VITALS
SYSTOLIC BLOOD PRESSURE: 128 MMHG | BODY MASS INDEX: 29.78 KG/M2 | WEIGHT: 208 LBS | DIASTOLIC BLOOD PRESSURE: 68 MMHG | RESPIRATION RATE: 16 BRPM | HEIGHT: 70 IN | OXYGEN SATURATION: 93 % | HEART RATE: 70 BPM | TEMPERATURE: 97.9 F

## 2024-07-29 DIAGNOSIS — Z87.01 HISTORY OF PNEUMONIA: ICD-10-CM

## 2024-07-29 DIAGNOSIS — I51.7 CARDIOMEGALY: ICD-10-CM

## 2024-07-29 DIAGNOSIS — R05.1 ACUTE COUGH: ICD-10-CM

## 2024-07-29 DIAGNOSIS — B96.89 ACUTE BACTERIAL SINUSITIS: ICD-10-CM

## 2024-07-29 DIAGNOSIS — J01.90 ACUTE BACTERIAL SINUSITIS: ICD-10-CM

## 2024-07-29 LAB
FLUAV RNA SPEC QL NAA+PROBE: NEGATIVE
FLUBV RNA SPEC QL NAA+PROBE: NEGATIVE
RSV RNA SPEC QL NAA+PROBE: NEGATIVE
SARS-COV-2 RNA RESP QL NAA+PROBE: NEGATIVE

## 2024-07-29 PROCEDURE — 700102 HCHG RX REV CODE 250 W/ 637 OVERRIDE(OP): Performed by: EMERGENCY MEDICINE

## 2024-07-29 PROCEDURE — 99284 EMERGENCY DEPT VISIT MOD MDM: CPT

## 2024-07-29 PROCEDURE — A9270 NON-COVERED ITEM OR SERVICE: HCPCS | Performed by: EMERGENCY MEDICINE

## 2024-07-29 PROCEDURE — 71046 X-RAY EXAM CHEST 2 VIEWS: CPT

## 2024-07-29 RX ORDER — IPRATROPIUM BROMIDE 42 UG/1
2 SPRAY, METERED NASAL EVERY 6 HOURS PRN
Qty: 15 ML | Refills: 0 | Status: SHIPPED | OUTPATIENT
Start: 2024-07-29

## 2024-07-29 RX ORDER — DOXYCYCLINE 100 MG/1
100 CAPSULE ORAL 2 TIMES DAILY
Qty: 20 CAPSULE | Refills: 0 | Status: ACTIVE | OUTPATIENT
Start: 2024-07-29 | End: 2024-08-08

## 2024-07-29 RX ORDER — DOXYCYCLINE 100 MG/1
100 TABLET ORAL ONCE
Status: COMPLETED | OUTPATIENT
Start: 2024-07-29 | End: 2024-07-29

## 2024-07-29 RX ADMIN — DOXYCYCLINE 100 MG: 100 TABLET, FILM COATED ORAL at 21:22

## 2024-07-29 ASSESSMENT — ENCOUNTER SYMPTOMS
FEVER: 0
HEADACHES: 0
CHILLS: 0
SPUTUM PRODUCTION: 1
NECK PAIN: 0
HEMOPTYSIS: 0
COUGH: 1
WHEEZING: 0
DIAPHORESIS: 0
SORE THROAT: 0
SHORTNESS OF BREATH: 0
DIZZINESS: 1

## 2024-07-29 ASSESSMENT — FIBROSIS 4 INDEX
FIB4 SCORE: 1.37
FIB4 SCORE: 1.37

## 2024-07-29 ASSESSMENT — PAIN DESCRIPTION - PAIN TYPE: TYPE: ACUTE PAIN

## 2024-07-30 NOTE — ED NOTES
Patient given discharge instructions. Rx given, education given on medications and sinusitis. Patient instructed to follow up with PCP. Patient provided education to come to ER if symptoms worsen. Discharged in stable condition with son, able to walk out with steady gait.

## 2024-07-30 NOTE — DISCHARGE INSTRUCTIONS
Sinusitis    Start antibiotics tomorrow.  Take Tylenol for fever or for pain.  Return for ill appearance, shortness of breath or inability to swallow.  Use over the counter decongestants.  See a doctor if not better in 5 days.      You have an acute (sudden) sinus infection. This is called sinusitis.    These infections commonly result from obstruction of the openings that drain your sinuses. Sinuses are air pockets within the bones of your face. This blockage results in your sinuses' inability to drain. This leads to infection.    There will be different areas of pain depending on which sinuses have become infected. The infected sinus may have overlying areas of pain.  The maxillary sinuses often produce pain beneath the eyes. Frontal sinusitis causes pain in the middle of the forehead and above the eyes. Other symptoms (problems) are back upper toothaches and purulent (colored, pus-like) discharge from the nose. Any inflammation (soreness), warmth, or tenderness over these same areas are signs of infection.    Your caregiver gave you antibiotics. These are medications that kill germs. You may also have been given a decongestant. This is most often determined by an exam. If x-rays have been taken, make sure you obtain your results or find out how you are to obtain them.     Take ibuprofen (Advil® or Motrin®), acetaminophen (Tylenol®), or both if you develop chills or fever. Ask your caregiver about over-the-counter medications you may be taking and if you should continue them. Should you develop other problems not relieved by your medications, see your caregiver or visit the Emergency Department.    [employer]® Patient Information ©2007 [employer].

## 2024-07-30 NOTE — ED TRIAGE NOTES
"Chief Complaint   Patient presents with    Nasal Congestion    Sent by MD     Pt was seen  for flu like symptoms that started a week before. At  pt had a chest x-ray and it showed pt had a enlarged heart. Pt is still have a sore throat. Pt is having have a productive cough with yellow and green mucus.     Flu Like Symptoms     /83   Pulse 68   Temp 37.2 °C (99 °F) (Temporal)   Resp 16   Ht 1.778 m (5' 10\")   Wt 95 kg (209 lb 7 oz)   SpO2 90%   BMI 30.05 kg/m²       "

## 2024-07-30 NOTE — ED PROVIDER NOTES
ED Provider Note    Scribed for Rodney Mcmahan M.D. by Alyssia Rodrigues. 7/29/2024  9:03 PM    Primary care provider: Som White D.O.  Means of arrival: Walk in    CHIEF COMPLAINT  Chief Complaint   Patient presents with    Nasal Congestion    Sent by MD     Pt was seen  for flu like symptoms that started a week before. At  pt had a chest x-ray and it showed pt had a enlarged heart. Pt is still have a sore throat. Pt is having have a productive cough with yellow and green mucus.     Flu Like Symptoms       EXTERNAL RECORDS REVIEWED  The patient was last seen today at urgent care for respiratory illness in which a chest x-ray showed cardiomegaly, no pulmonary infiltrate or pleural effusion.     WILLIAM Baldwin is a 78 y.o. female who presents to the Emergency Department for nasal congestion and a cough onset 2 weeks ago.was sent here from urgent care who was concerned about cardiomegaly on chest x-ray and borderline low oxygenation on room air.  The patient reports that she has rhinorrhea, and is coughing and spitting up phlegm. She adds she also has a sore throat which she treated with a gargle of hydrogen peroxide and appeared to alleviate the pain.She confirms aching ears for which she swabbed with hydrogen peroxide. She reports a subjective fever. She denies any facial pain, headache, chest pain, cough, shortness of breath, or calf pain. She states she is diabetic. She adds she has a history of sinus infection. She reports no history of blood clots. She states she had surgery two weeks ago for a herniated disc. She reports an allergy to penicillin.     LIMITATION TO HISTORY   None    OUTSIDE HISTORIAN(S):  The patient's son was present at bedside and aided in provided the patients history.    PAST MEDICAL HISTORY  Past Medical History:   Diagnosis Date    Arthritis 11/23/2011    Cough 5/10/2012    Dyslipidemia 5/10/2012    Exudative macular degeneration (HCC) 11/23/2011    HTN (hypertension)  5/10/2012    Obesity 11/23/2011    Palpitations 5/10/2012    Simple goiter 2/6/2012       FAMILY HISTORY  Family History   Problem Relation Age of Onset    Stroke Other        SOCIAL HISTORY  Social History     Tobacco Use    Smoking status: Never    Smokeless tobacco: Never   Vaping Use    Vaping status: Never Used   Substance Use Topics    Alcohol use: Never    Drug use: Never     Social History     Substance and Sexual Activity   Drug Use Never       SURGICAL HISTORY  Past Surgical History:   Procedure Laterality Date    HIP REPLACEMENT, TOTAL      HYSTERECTOMY, TOTAL ABDOMINAL      KNEE REPLACEMENT, TOTAL      OTHER      LAPAROSC SM INTEST LASER VAPORIZATION ENDOMETRIOTIC TISSUE.       CURRENT MEDICATIONS  No current facility-administered medications for this encounter.    Current Outpatient Medications:     ipratropium (ATROVENT) 0.06 % Solution, Administer 2 Sprays into affected nostril(S) every 6 hours as needed (rhinorrhea, congestion)., Disp: 15 mL, Rfl: 0    doxycycline (MONODOX) 100 MG capsule, Take 1 Capsule by mouth 2 times a day for 10 days., Disp: 20 Capsule, Rfl: 0    metFORMIN ER (GLUCOPHAGE XR) 500 MG TABLET SR 24 HR, Take 1 Tablet by mouth every day., Disp: 90 Tablet, Rfl: 1    traMADol (ULTRAM) 50 MG Tab, , Disp: , Rfl:     aspirin 81 MG EC tablet, Take 162 mg by mouth every day., Disp: , Rfl:     fluticasone (FLONASE) 50 MCG/ACT nasal spray, , Disp: , Rfl:     gabapentin (NEURONTIN) 300 MG Cap, , Disp: , Rfl:     Multiple Vitamins-Minerals (PRESERVISION AREDS 2) Cap, Take 2 Capsules by mouth every day., Disp: , Rfl:     ketoconazole (NIZORAL) 2 % Cream, Apply 1 Application topically every day., Disp: , Rfl:     Cinnamon 500 MG Cap, Take 500 mg by mouth every day., Disp: , Rfl:     levothyroxine (SYNTHROID) 100 MCG Tab, Take 1 Tablet by mouth every day.  , Disp: , Rfl:     pravastatin (PRAVACHOL) 80 MG tablet, Take 1 Tablet by mouth every day., Disp: 100 Tablet, Rfl: 1    spironolactone  "(ALDACTONE) 25 MG Tab, Take 0.5 Tablets by mouth every day., Disp: 90 Tablet, Rfl: 3    polyethylene glycol/lytes (MIRALAX) Pack, Take 1 Packet by mouth every day. For constipation, Disp: 72 Each, Rfl: 3    psyllium 25 % packet, Take 1 Packet by mouth 2 times a day., Disp: 90 Each, Rfl: 1    oyster shell calcium (CALCIUM CARBONATE) 500 MG TABS, Take 500 mg by mouth every day.  , Disp: , Rfl:     Daily Multiple Vitamins TABS, Take 1 Tab by mouth every day.  , Disp: , Rfl:     docosahexanoic acid (FISH OIL) 1000 MG CAPS, Take 1,000 mg by mouth 3 times a day, with meals.  , Disp: , Rfl:     vitamin D (CHOLECALCIFEROL) 1000 UNIT TABS, Take  by mouth. TAKE AS DIRECTED. , Disp: , Rfl:     ALLERGIES  Allergies   Allergen Reactions    Pcn [Penicillins]        PHYSICAL EXAM  VITAL SIGNS: /83   Pulse 68   Temp 37.2 °C (99 °F) (Temporal)   Resp 16   Ht 1.778 m (5' 10\")   Wt 95 kg (209 lb 7 oz)   SpO2 90%   BMI 30.05 kg/m²   Reviewed and afebrile  Constitutional: Well developed, Well nourished.  HENT: Normocephalic, atraumatic, bilateral external ears normal, No intraoral erythema, edema, exudate  Eyes: PERRLA, conjunctiva pink, no scleral icterus.   Cardiovascular: Regular rate and rhythm. No murmurs, rubs or gallops.  No dependent edema or calf tenderness  Respiratory: Lungs clear to auscultation bilaterally. No wheezes, rales, or rhonchi.  Abdominal:  Abdomen soft, non-tender, non distended. No rebound, or guarding.    Skin: No erythema, no rash. No wounds or bruising.  Genitourinary: No costovertebral angle tenderness.   Musculoskeletal: no deformities.   Neurologic: Alert & oriented x 3, cranial nerves 2-12 intact by passive exam.  No focal deficit noted.  Psychiatric: Affect normal, Judgment normal, Mood normal.      LABS   Reviewed by Me from urgent care today:  Results for orders placed or performed in visit on 07/29/24   POCT CoV-2, Flu A/B, RSV by PCR   Result Value Ref Range    SARS-CoV-2 by PCR Negative " Negative, Invalid    Influenza virus A RNA Negative Negative, Invalid    Influenza virus B, PCR Negative Negative, Invalid    RSV, PCR Negative Negative, Invalid     Interpretations:  Pulse ox: Now normal at 94% on room air    COURSE & MEDICAL DECISION MAKING  9:03 PM - Patient seen and examined at bedside.     INTERVENTIONS BY ME:  Chest x-ray results from urgent care reviewed and no pneumonia present      ASSESSMENT, COURSE AND PLAN:  PROBLEMS EVALUATED THIS VISIT:    Patient presents with a 2-week respiratory illness, history of prior sinusitis and immune compromised with diabetes.  She has no pneumonia.  There is no evidence of COVID influenza or RSV.  She likely has acute bacterial sinusitis which should be treated given her increased risk from diabetes    DISPOSITION AND DISCUSSIONS    I have discussed management of the patient with the following physicians and sources:    None    RISK:  Increased given diabetes and immune compromised so I will treat her with prescription antibiotics.  Risk is moderate given need for prescription doxycycline.    PLAN:  Discharge Medication List as of 7/29/2024  9:29 PM        START taking these medications    Details   ipratropium (ATROVENT) 0.06 % Solution Administer 2 Sprays into affected nostril(S) every 6 hours as needed (rhinorrhea, congestion)., Disp-15 mL, R-0, Normal      doxycycline (MONODOX) 100 MG capsule Take 1 Capsule by mouth 2 times a day for 10 days., Disp-20 Capsule, R-0, Normal           Bacterial sinusitis handout given    Return for increased sinus pain.     Follow up:  Som White D.O.  [address] [id number] [phone]    Schedule an appointment as soon as possible for a visit in 5 days  As needed if not better      CONDITION: Patient was discharged in good condition.     FINAL IMPRESSION  1. Acute bacterial sinusitis            IAlyssia (Scribe), am scribing for, and in the presence of, Rodney Mcmahan,  M.D..    Electronically signed by: Alyssia Rodrigues (Scribe), 7/29/2024    I, Rodney Mcmahan M.D. personally performed the services described in this documentation, as scribed by Alyssia Rodrigues in my presence, and it is both accurate and complete.    The note accurately reflects work and decisions made by me.  Rodney Mcmahan M.D.  7/30/2024  12:00 AM

## 2024-08-01 ENCOUNTER — APPOINTMENT (OUTPATIENT)
Dept: PHYSICAL THERAPY | Facility: REHABILITATION | Age: 79
End: 2024-08-01
Attending: STUDENT IN AN ORGANIZED HEALTH CARE EDUCATION/TRAINING PROGRAM
Payer: MEDICARE

## 2024-08-06 ENCOUNTER — APPOINTMENT (OUTPATIENT)
Dept: PHYSICAL THERAPY | Facility: REHABILITATION | Age: 79
End: 2024-08-06
Attending: STUDENT IN AN ORGANIZED HEALTH CARE EDUCATION/TRAINING PROGRAM
Payer: MEDICARE

## 2024-08-08 ENCOUNTER — APPOINTMENT (OUTPATIENT)
Dept: PHYSICAL THERAPY | Facility: REHABILITATION | Age: 79
End: 2024-08-08
Attending: STUDENT IN AN ORGANIZED HEALTH CARE EDUCATION/TRAINING PROGRAM
Payer: MEDICARE

## 2024-08-13 ENCOUNTER — APPOINTMENT (OUTPATIENT)
Dept: PHYSICAL THERAPY | Facility: REHABILITATION | Age: 79
End: 2024-08-13
Attending: STUDENT IN AN ORGANIZED HEALTH CARE EDUCATION/TRAINING PROGRAM
Payer: MEDICARE

## 2024-08-15 ENCOUNTER — APPOINTMENT (OUTPATIENT)
Dept: PHYSICAL THERAPY | Facility: REHABILITATION | Age: 79
End: 2024-08-15
Attending: STUDENT IN AN ORGANIZED HEALTH CARE EDUCATION/TRAINING PROGRAM
Payer: MEDICARE

## 2024-08-20 ENCOUNTER — APPOINTMENT (OUTPATIENT)
Dept: PHYSICAL THERAPY | Facility: REHABILITATION | Age: 79
End: 2024-08-20
Attending: STUDENT IN AN ORGANIZED HEALTH CARE EDUCATION/TRAINING PROGRAM
Payer: MEDICARE

## 2024-12-12 ENCOUNTER — OFFICE VISIT (OUTPATIENT)
Dept: MEDICAL GROUP | Facility: PHYSICIAN GROUP | Age: 79
End: 2024-12-12
Payer: MEDICARE

## 2024-12-12 VITALS
OXYGEN SATURATION: 96 % | BODY MASS INDEX: 30.35 KG/M2 | HEIGHT: 70 IN | SYSTOLIC BLOOD PRESSURE: 114 MMHG | HEART RATE: 74 BPM | DIASTOLIC BLOOD PRESSURE: 60 MMHG | WEIGHT: 212 LBS | TEMPERATURE: 98.5 F

## 2024-12-12 DIAGNOSIS — Z76.89 ENCOUNTER TO ESTABLISH CARE: ICD-10-CM

## 2024-12-12 DIAGNOSIS — E11.22 TYPE 2 DIABETES MELLITUS WITH STAGE 3A CHRONIC KIDNEY DISEASE, WITHOUT LONG-TERM CURRENT USE OF INSULIN (HCC): ICD-10-CM

## 2024-12-12 DIAGNOSIS — J30.2 SEASONAL ALLERGIES: ICD-10-CM

## 2024-12-12 DIAGNOSIS — E78.5 DYSLIPIDEMIA: Chronic | ICD-10-CM

## 2024-12-12 DIAGNOSIS — E66.9 OBESITY (BMI 30-39.9): ICD-10-CM

## 2024-12-12 DIAGNOSIS — I50.32 CHRONIC HEART FAILURE WITH PRESERVED EJECTION FRACTION (HCC): ICD-10-CM

## 2024-12-12 DIAGNOSIS — H35.3230 BILATERAL EXUDATIVE AGE-RELATED MACULAR DEGENERATION, UNSPECIFIED STAGE (HCC): Chronic | ICD-10-CM

## 2024-12-12 DIAGNOSIS — N18.31 TYPE 2 DIABETES MELLITUS WITH STAGE 3A CHRONIC KIDNEY DISEASE, WITHOUT LONG-TERM CURRENT USE OF INSULIN (HCC): ICD-10-CM

## 2024-12-12 DIAGNOSIS — E03.9 HYPOTHYROIDISM, UNSPECIFIED TYPE: ICD-10-CM

## 2024-12-12 PROBLEM — E11.29 TYPE 2 DIABETES MELLITUS WITH KIDNEY COMPLICATION, WITHOUT LONG-TERM CURRENT USE OF INSULIN (HCC): Status: ACTIVE | Noted: 2024-03-25

## 2024-12-12 PROBLEM — M54.42 ACUTE LEFT-SIDED LOW BACK PAIN WITH LEFT-SIDED SCIATICA: Status: RESOLVED | Noted: 2024-03-25 | Resolved: 2024-12-12

## 2024-12-12 LAB
HBA1C MFR BLD: 7.1 % (ref ?–5.8)
POCT INT CON NEG: NEGATIVE
POCT INT CON POS: POSITIVE

## 2024-12-12 PROCEDURE — 3078F DIAST BP <80 MM HG: CPT | Performed by: NURSE PRACTITIONER

## 2024-12-12 PROCEDURE — 99214 OFFICE O/P EST MOD 30 MIN: CPT | Performed by: NURSE PRACTITIONER

## 2024-12-12 PROCEDURE — 83036 HEMOGLOBIN GLYCOSYLATED A1C: CPT | Performed by: NURSE PRACTITIONER

## 2024-12-12 PROCEDURE — 3074F SYST BP LT 130 MM HG: CPT | Performed by: NURSE PRACTITIONER

## 2024-12-12 RX ORDER — LEVOTHYROXINE SODIUM 100 MCG
100 TABLET ORAL
COMMUNITY

## 2024-12-12 RX ORDER — SPIRONOLACTONE 25 MG/1
12.5 TABLET ORAL DAILY
Qty: 90 TABLET | Refills: 3 | Status: SHIPPED | OUTPATIENT
Start: 2024-12-12

## 2024-12-12 RX ORDER — PRAVASTATIN SODIUM 80 MG/1
80 TABLET ORAL DAILY
Qty: 100 TABLET | Refills: 3 | Status: SHIPPED | OUTPATIENT
Start: 2024-12-12

## 2024-12-12 RX ORDER — LEVOTHYROXINE SODIUM 100 UG/1
100 TABLET ORAL
COMMUNITY
End: 2024-12-12

## 2024-12-12 RX ORDER — METFORMIN HYDROCHLORIDE 500 MG/1
500 TABLET, EXTENDED RELEASE ORAL 2 TIMES DAILY
Qty: 180 TABLET | Refills: 1 | Status: SHIPPED | OUTPATIENT
Start: 2024-12-12 | End: 2024-12-23 | Stop reason: SDUPTHER

## 2024-12-12 ASSESSMENT — FIBROSIS 4 INDEX: FIB4 SCORE: 1.39

## 2024-12-12 NOTE — ASSESSMENT & PLAN NOTE
Continues pravastatin 80 mg daily.  3/2024 lipid panel showed controlled cholesterol.  She does need a medication refill today.  Her provider in [place] had told her to take 2 baby aspirin's a day due to history of multiple strokes in her mother.  Her most recent PCP had informed her to stop.  Patient message to provider who told her to resume the aspirin.  She is taking aspirin 81 mg daily.

## 2024-12-12 NOTE — LETTER
[facility]  SARAHI LudwigPMuraliRMuraliN.  [address] [place] [address]  Fax: [phone]   Authorization for Release/Disclosure of   Protected Health Information   Name: KIMBERLY HORTON UHLIG : 1945 SSN: [id number]   Address: [address] [place] [address] [address] [place] [address] Phone:    There are no phone numbers on file.   I authorize the entity listed below to release/disclose the PHI below to:   [facility]/SARAHI [email or web address] and VICK Ludwig   Provider or Entity Name:  [facility]   Address   City, State, Zip   Phone:      Fax:     Reason for request: continuity of care   Information to be released:    [  ] LAST COLONOSCOPY,  including any PATH REPORT and follow-up  [  ] LAST FIT/COLOGUARD RESULT [  ] LAST DEXA  [  ] LAST MAMMOGRAM  [  ] LAST PAP  [  ] LAST LABS [xxx ] RETINA EXAM REPORT  [  ] IMMUNIZATION RECORDS  [  ] Release all info      [  ] Check here and initial the line next to each item to release ALL health information INCLUDING  _____ Care and treatment for drug and / or alcohol abuse  _____ HIV testing, infection status, or AIDS  _____ Genetic Testing    DATES OF SERVICE OR TIME PERIOD TO BE DISCLOSED: _____________  I understand and acknowledge that:  * This Authorization may be revoked at any time by you in writing, except if your health information has already been used or disclosed.  * Your health information that will be used or disclosed as a result of you signing this authorization could be re-disclosed by the recipient. If this occurs, your re-disclosed health information may no longer be protected by State or Federal laws.  * You may refuse to sign this Authorization. Your refusal will not affect your ability to obtain treatment.  * This Authorization becomes effective upon signing and will  on (date) __________.      If no date is indicated, this Authorization will  one (1) year from the signature date.    Name: Kimberly Horton  Uhlig  Signature: Date:   12/12/2024     PLEASE FAX REQUESTED RECORDS BACK TO: [phone]

## 2024-12-12 NOTE — ASSESSMENT & PLAN NOTE
Last echo 12/20/2022 with prior cardiologist, Dr. Roberto Daily. Left EF 60 to 65%, mild concentric left ventricular hypertrophy. She last seen her cardiologist in [place] about a year ago. She is taking spironolactone 12.5 mg daily.

## 2024-12-12 NOTE — ASSESSMENT & PLAN NOTE
She states her bottle says Synthroid 100 mcg daily. She does have a goiter. At her diagnosis she was told she had goiter.  3/2024 TSH WNL.

## 2024-12-12 NOTE — PROGRESS NOTES
Subjective:     CC:  Diagnoses of Hypothyroidism, unspecified type, Bilateral exudative age-related macular degeneration, unspecified stage ([facility]), Dyslipidemia, Seasonal allergies, Chronic heart failure with preserved ejection fraction (HCC), Type 2 diabetes mellitus with stage 3a chronic kidney disease, without long-term current use of insulin ([facility]), Hyperlipidemia, Hypertension, and Obesity (BMI 30-39.9) were pertinent to this visit.    HISTORY OF THE PRESENT ILLNESS: Patient is a 79 y.o. female. This pleasant patient is here today to establish care and discuss the following. Her prior PCP was Dr. White.    Hypothyroidism  She states her bottle says Synthroid 100 mcg daily. She does have a goiter. At her diagnosis she was told she had goiter.  3/2024 TSH WNL.    Exudative macular degeneration ([facility])  Reports that she has left eye dry macular degeneration and right eye wet macular degeneration. She is taking Preservision Areds2. She is followed by [facility].  We will request her retinal records today.    Dyslipidemia  Continues pravastatin 80 mg daily.  3/2024 lipid panel showed controlled cholesterol.  She does need a medication refill today.  Her provider in [place] had told her to take 2 baby aspirin's a day due to history of multiple strokes in her mother.  Her most recent PCP had informed her to stop.  Patient message to provider who told her to resume the aspirin.  She is taking aspirin 81 mg daily.    Seasonal allergies  Taking over-the-counter Benadryl.    Chronic heart failure with preserved ejection fraction (HCC)  Last echo 12/20/2022 with prior cardiologist, Dr. Roberto Daily. Left EF 60 to 65%, mild concentric left ventricular hypertrophy. She last seen her cardiologist in [place] about a year ago. She is taking spironolactone 12.5 mg daily.    Type 2 diabetes mellitus with kidney complication, without long-term current use of insulin ([facility])  She has previously seen nephrology in [place]  [place].  Her nephrologist told her that her kidneys were 50%. GFR 53 in 6/2024.  She reports that she was previously on glyburide 1.25 mg daily and the medication controlled her A1c.  Her previous PCP put her on metformin  mg in the evening. Last A1C 6.5% 6/2024.  She is concerned that the metformin is not controlling her blood sugars. She has one touch verio flex meter. Needs more test strips.  She has her meter today that shows her blood sugars have been:   205  160  124  168  163  154  146  153  166  155  182  177    A1c today has increased to 7.1%.  Would like to see her blood sugars under 130.  Plan to increase metformin ER to 500 mg twice daily.      Allergies: Pcn [penicillins]    Current Outpatient Medications Ordered in Epic   Medication Sig Dispense Refill    LUTEIN PO Take  by mouth. With Zeaxanthin daily      diphenhydrAMINE-APAP, sleep, (TYLENOL PM EXTRA STRENGTH PO) Take  by mouth. Nightly      diphenhydrAMINE HCl (BENADRYL ALLERGY PO) Take  by mouth. 2 at night      metFORMIN ER (GLUCOPHAGE XR) 500 MG TABLET SR 24 HR Take 1 Tablet by mouth 2 times a day. 180 Tablet 1    pravastatin (PRAVACHOL) 80 MG tablet Take 1 Tablet by mouth every day. 100 Tablet 3    spironolactone (ALDACTONE) 25 MG Tab Take 0.5 Tablets by mouth every day. 90 Tablet 3    SYNTHROID 100 MCG Tab Take 100 mcg by mouth every morning on an empty stomach.      Blood Glucose Test Strips Use one One Touch Verio Flex strip to test blood sugar once daily . 100 Strip 3    aspirin 81 MG EC tablet Take 81 mg by mouth every day.      Multiple Vitamins-Minerals (PRESERVISION AREDS 2) Cap Take 2 Capsules by mouth every day.      polyethylene glycol/lytes (MIRALAX) Pack Take 1 Packet by mouth every day. For constipation 72 Each 3    Daily Multiple Vitamins TABS Take 1 Tab by mouth every day.        vitamin D (CHOLECALCIFEROL) 1000 UNIT TABS Take  by mouth. TAKE AS DIRECTED.        No current Epic-ordered facility-administered medications on  "file.       Past Medical History:   Diagnosis Date    Arthritis 11/23/2011    Cough 5/10/2012    Dyslipidemia 5/10/2012    Exudative macular degeneration (HCC) 11/23/2011    HTN (hypertension) 5/10/2012    Obesity 11/23/2011    Palpitations 5/10/2012    Simple goiter 2/6/2012       Past Surgical History:   Procedure Laterality Date    OTHER  04/19/2021    neck plate    HIP REPLACEMENT, TOTAL Right 2007    x 2 replacements, 2007 and 2014    HYSTERECTOMY, TOTAL ABDOMINAL  1987    KNEE REPLACEMENT, TOTAL Bilateral     2007 and 2009    OTHER      LAPAROSC SM INTEST LASER VAPORIZATION ENDOMETRIOTIC TISSUE.       Social History     Tobacco Use    Smoking status: Never    Smokeless tobacco: Never   Vaping Use    Vaping status: Never Used   Substance Use Topics    Alcohol use: Never    Drug use: Never       Social History     Social History Narrative    Not on file       Family History   Problem Relation Age of Onset    Stroke Mother     Stroke Other        Health Maintenance: Requesting retinal records. Declines influenza vaccine today.        Objective:     Vital signs reviewed   Exam: /60 (BP Location: Right arm, Patient Position: Sitting, BP Cuff Size: Adult)   Pulse 74   Temp 36.9 °C (98.5 °F) (Temporal)   Ht 1.778 m (5' 10\")   Wt 96.2 kg (212 lb)   SpO2 96%  Body mass index is 30.42 kg/m².    Gen: Alert and oriented, No apparent distress.  Neck: Neck is supple without lymphadenopathy. Goiter present.  Lungs: Normal effort, CTA bilaterally, no wheezes, rhonchi, or rales.    CV: Regular rate and rhythm. No murmurs, rubs, or gallops.  Ext: No clubbing, cyanosis, edema    Labs:      Latest Reference Range & Units 12/12/24 10:38   Glycohemoglobin <=5.8 % 7.1 !   !: Data is abnormal    Assessment & Plan:   79 y.o. female with the following -    1. Encounter to establish care  Acute uncomplicated problem.  Care established today.    2. Type 2 diabetes mellitus with stage 3a chronic kidney disease, without " long-term current use of insulin (HCC)  Chronic exacerbated problem.  Home glucose has been elevated and A1c is increased.  Increasing metformin ER to 500 mg twice daily.  Plan for repeat A1c and labs in 3 months around March 2025.  - POCT  A1C  - CBC WITH DIFFERENTIAL; Future  - Comp Metabolic Panel; Future  - HEMOGLOBIN A1C; Future  - Lipid Profile; Future  - MICROALBUMIN CREAT RATIO URINE; Future  - metFORMIN ER (GLUCOPHAGE XR) 500 MG TABLET SR 24 HR; Take 1 Tablet by mouth 2 times a day.  Dispense: 180 Tablet; Refill: 1  - Blood Glucose Test Strips; Use one One Touch Verio Flex strip to test blood sugar once daily .  Dispense: 100 Strip; Refill: 3    3. Dyslipidemia  Chronic stable problem.  Continue pravastatin 80 mg daily and aspirin 81 mg daily.  - Lipid Profile; Future  - pravastatin (PRAVACHOL) 80 MG tablet; Take 1 Tablet by mouth every day.  Dispense: 100 Tablet; Refill: 3    4. Chronic heart failure with preserved ejection fraction (HCC)  Chronic stable problem.  Denies history of hypertension.  Continue spironolactone 12.5 mg daily.  - spironolactone (ALDACTONE) 25 MG Tab; Take 0.5 Tablets by mouth every day.  Dispense: 90 Tablet; Refill: 3    5. Hypothyroidism, unspecified type  Chronic stable problem.  Continue Synthroid 100 mcg daily in the stomach.  Due for labs around March 2025.  - TSH WITH REFLEX TO FT4; Future    6. Bilateral exudative age-related macular degeneration, unspecified stage (HCC)  Chronic stable problem.  Continue follow-up with [facility].    7. Obesity (BMI 30-39.9)  Chronic stable problem.  Encouraged healthy diet and exercise.  - Patient identified as having weight management issue.  Appropriate orders and counseling given.    8. Seasonal allergies  Chronic stable problem.  Continue Benadryl as needed.      Return in about 3 months (around 3/12/2025) for annual wellness visit, Labs.    Please note that this dictation was created using voice recognition software. I  have made every reasonable attempt to correct obvious errors, but I expect that there are errors of grammar and possibly content that I did not discover before finalizing the note.

## 2024-12-13 NOTE — ASSESSMENT & PLAN NOTE
She has previously seen nephrology in [facility].  Her nephrologist told her that her kidneys were 50%. GFR 53 in 6/2024.  She reports that she was previously on glyburide 1.25 mg daily and the medication controlled her A1c.  Her previous PCP put her on metformin  mg in the evening. Last A1C 6.5% 6/2024.  She is concerned that the metformin is not controlling her blood sugars. She has one touch verio flex meter. Needs more test strips.  She has her meter today that shows her blood sugars have been:   205  160  124  168  163  154  146  153  166  155  182  177    A1c today has increased to 7.1%.  Would like to see her blood sugars under 130.  Plan to increase metformin ER to 500 mg twice daily.

## 2024-12-22 DIAGNOSIS — E11.22 TYPE 2 DIABETES MELLITUS WITH STAGE 3A CHRONIC KIDNEY DISEASE, WITHOUT LONG-TERM CURRENT USE OF INSULIN (HCC): ICD-10-CM

## 2024-12-22 DIAGNOSIS — N18.31 TYPE 2 DIABETES MELLITUS WITH STAGE 3A CHRONIC KIDNEY DISEASE, WITHOUT LONG-TERM CURRENT USE OF INSULIN (HCC): ICD-10-CM

## 2024-12-23 RX ORDER — METFORMIN HYDROCHLORIDE 500 MG/1
500 TABLET, EXTENDED RELEASE ORAL 2 TIMES DAILY
Qty: 180 TABLET | Refills: 1 | Status: SHIPPED | OUTPATIENT
Start: 2024-12-23

## 2024-12-23 RX ORDER — METFORMIN HYDROCHLORIDE 500 MG/1
500 TABLET, EXTENDED RELEASE ORAL
Qty: 90 TABLET | Refills: 1 | OUTPATIENT
Start: 2024-12-23

## 2024-12-23 NOTE — TELEPHONE ENCOUNTER
Requested Prescriptions     Signed Prescriptions Disp Refills    metFORMIN ER (GLUCOPHAGE XR) 500 MG TABLET SR 24  Tablet 1     Sig: Take 1 Tablet by mouth 2 times a day.     Authorizing Provider: SONAM MAYORGA     Refused Prescriptions Disp Refills    metFORMIN ER (GLUCOPHAGE XR) 500 MG TABLET SR 24 HR [Pharmacy Med Name: METFORMIN ER 500MG TB24 (AB1)] 90 Tablet 1     Sig: TAKE ONE TABLET BY MOUTH EVERY DAY     Refused By: SONAM MAYORGA     Reason for Refusal: Refill not appropriate.     VICK Ludwig

## 2024-12-23 NOTE — TELEPHONE ENCOUNTER
Received request via: Patient    Was the patient seen in the last year in this department? Yes    Does the patient have an active prescription (recently filled or refills available) for medication(s) requested? No    [facility]    Does the patient have retirement Plus and need 100-day supply? (This applies to ALL medications) Patient does not have SCP

## 2025-02-02 DIAGNOSIS — E11.22 TYPE 2 DIABETES MELLITUS WITH STAGE 3A CHRONIC KIDNEY DISEASE, WITHOUT LONG-TERM CURRENT USE OF INSULIN (HCC): ICD-10-CM

## 2025-02-02 DIAGNOSIS — N18.31 TYPE 2 DIABETES MELLITUS WITH STAGE 3A CHRONIC KIDNEY DISEASE, WITHOUT LONG-TERM CURRENT USE OF INSULIN (HCC): ICD-10-CM

## 2025-02-03 RX ORDER — METFORMIN HYDROCHLORIDE 500 MG/1
500 TABLET, EXTENDED RELEASE ORAL
Qty: 90 TABLET | Refills: 1 | OUTPATIENT
Start: 2025-02-03

## 2025-02-03 NOTE — TELEPHONE ENCOUNTER
Requested Prescriptions     Refused Prescriptions Disp Refills    metFORMIN ER (GLUCOPHAGE XR) 500 MG TABLET SR 24 HR [Pharmacy Med Name: METFORMIN ER 500MG TB24 (AB1)] 90 Tablet 1     Sig: TAKE ONE TABLET BY MOUTH EVERY DAY     Refused By: SONAM MAYORGA     Reason for Refusal: Refill not appropriate.     Last rx 12/23/2024 for Metformin  mg twice daily.     GRAHAM Ludwig.

## 2025-02-03 NOTE — TELEPHONE ENCOUNTER
Received request via: Patient    Was the patient seen in the last year in this department? Yes    Does the patient have an active prescription (recently filled or refills available) for medication(s) requested? No    Pharmacy Name: [email or web address]    Does the patient have [facility] Plus and need 100-day supply? (This applies to ALL medications) Patient does not have SCP

## 2025-02-06 ENCOUNTER — TELEPHONE (OUTPATIENT)
Dept: MEDICAL GROUP | Facility: PHYSICIAN GROUP | Age: 80
End: 2025-02-06
Payer: MEDICARE

## 2025-02-06 DIAGNOSIS — N18.31 TYPE 2 DIABETES MELLITUS WITH STAGE 3A CHRONIC KIDNEY DISEASE, WITHOUT LONG-TERM CURRENT USE OF INSULIN (HCC): ICD-10-CM

## 2025-02-06 DIAGNOSIS — E11.22 TYPE 2 DIABETES MELLITUS WITH STAGE 3A CHRONIC KIDNEY DISEASE, WITHOUT LONG-TERM CURRENT USE OF INSULIN (HCC): ICD-10-CM

## 2025-02-06 RX ORDER — AVOBENZONE, HOMOSALATE, OCTISALATE, OCTOCRYLENE 30; 40; 45; 26 MG/ML; MG/ML; MG/ML; MG/ML
CREAM TOPICAL
Qty: 100 EACH | Refills: 3 | Status: SHIPPED | OUTPATIENT
Start: 2025-02-06

## 2025-02-07 NOTE — TELEPHONE ENCOUNTER
Requested Prescriptions     Signed Prescriptions Disp Refills    Lancets 100 Each 3     Sig: Lancets order: Lancets for One Touch Verio Flex meter. Sig: use daily and prn ssx high or low sugar. #100 RF x 3     Authorizing Provider: SONAM MAYORGA A.P.R.N.

## 2025-02-07 NOTE — TELEPHONE ENCOUNTER
Pt left voicemail requesting lancets to check her blood sugar, pt received test strips but didn't get lancets. She would like them sent to [facility] rx

## 2025-04-04 ENCOUNTER — HOSPITAL ENCOUNTER (OUTPATIENT)
Dept: LAB | Facility: MEDICAL CENTER | Age: 80
End: 2025-04-04
Attending: NURSE PRACTITIONER
Payer: MEDICARE

## 2025-04-04 DIAGNOSIS — E03.9 HYPOTHYROIDISM, UNSPECIFIED TYPE: ICD-10-CM

## 2025-04-04 DIAGNOSIS — E78.5 DYSLIPIDEMIA: Chronic | ICD-10-CM

## 2025-04-04 DIAGNOSIS — E11.22 TYPE 2 DIABETES MELLITUS WITH STAGE 3A CHRONIC KIDNEY DISEASE, WITHOUT LONG-TERM CURRENT USE OF INSULIN (HCC): ICD-10-CM

## 2025-04-04 DIAGNOSIS — N18.31 TYPE 2 DIABETES MELLITUS WITH STAGE 3A CHRONIC KIDNEY DISEASE, WITHOUT LONG-TERM CURRENT USE OF INSULIN (HCC): ICD-10-CM

## 2025-04-04 LAB
ALBUMIN SERPL BCP-MCNC: 4.1 G/DL (ref 3.2–4.9)
ALBUMIN/GLOB SERPL: 1.4 G/DL
ALP SERPL-CCNC: 57 U/L (ref 30–99)
ALT SERPL-CCNC: 20 U/L (ref 2–50)
ANION GAP SERPL CALC-SCNC: 10 MMOL/L (ref 7–16)
AST SERPL-CCNC: 22 U/L (ref 12–45)
BASOPHILS # BLD AUTO: 0.6 % (ref 0–1.8)
BASOPHILS # BLD: 0.05 K/UL (ref 0–0.12)
BILIRUB SERPL-MCNC: 0.5 MG/DL (ref 0.1–1.5)
BUN SERPL-MCNC: 26 MG/DL (ref 8–22)
CALCIUM ALBUM COR SERPL-MCNC: 9.4 MG/DL (ref 8.5–10.5)
CALCIUM SERPL-MCNC: 9.5 MG/DL (ref 8.5–10.5)
CHLORIDE SERPL-SCNC: 103 MMOL/L (ref 96–112)
CHOLEST SERPL-MCNC: 146 MG/DL (ref 100–199)
CO2 SERPL-SCNC: 27 MMOL/L (ref 20–33)
CREAT SERPL-MCNC: 1.07 MG/DL (ref 0.5–1.4)
EOSINOPHIL # BLD AUTO: 0.24 K/UL (ref 0–0.51)
EOSINOPHIL NFR BLD: 2.8 % (ref 0–6.9)
ERYTHROCYTE [DISTWIDTH] IN BLOOD BY AUTOMATED COUNT: 49.1 FL (ref 35.9–50)
EST. AVERAGE GLUCOSE BLD GHB EST-MCNC: 154 MG/DL
FASTING STATUS PATIENT QL REPORTED: NORMAL
GFR SERPLBLD CREATININE-BSD FMLA CKD-EPI: 52 ML/MIN/1.73 M 2
GLOBULIN SER CALC-MCNC: 2.9 G/DL (ref 1.9–3.5)
GLUCOSE SERPL-MCNC: 133 MG/DL (ref 65–99)
HBA1C MFR BLD: 7 % (ref 4–5.6)
HCT VFR BLD AUTO: 48.1 % (ref 37–47)
HDLC SERPL-MCNC: 55 MG/DL
HGB BLD-MCNC: 15.5 G/DL (ref 12–16)
IMM GRANULOCYTES # BLD AUTO: 0.02 K/UL (ref 0–0.11)
IMM GRANULOCYTES NFR BLD AUTO: 0.2 % (ref 0–0.9)
LDLC SERPL CALC-MCNC: 68 MG/DL
LYMPHOCYTES # BLD AUTO: 2.24 K/UL (ref 1–4.8)
LYMPHOCYTES NFR BLD: 26 % (ref 22–41)
MCH RBC QN AUTO: 31.9 PG (ref 27–33)
MCHC RBC AUTO-ENTMCNC: 32.2 G/DL (ref 32.2–35.5)
MCV RBC AUTO: 99 FL (ref 81.4–97.8)
MONOCYTES # BLD AUTO: 0.79 K/UL (ref 0–0.85)
MONOCYTES NFR BLD AUTO: 9.2 % (ref 0–13.4)
NEUTROPHILS # BLD AUTO: 5.27 K/UL (ref 1.82–7.42)
NEUTROPHILS NFR BLD: 61.2 % (ref 44–72)
NRBC # BLD AUTO: 0 K/UL
NRBC BLD-RTO: 0 /100 WBC (ref 0–0.2)
PLATELET # BLD AUTO: 264 K/UL (ref 164–446)
PMV BLD AUTO: 10.3 FL (ref 9–12.9)
POTASSIUM SERPL-SCNC: 4.8 MMOL/L (ref 3.6–5.5)
PROT SERPL-MCNC: 7 G/DL (ref 6–8.2)
RBC # BLD AUTO: 4.86 M/UL (ref 4.2–5.4)
SODIUM SERPL-SCNC: 140 MMOL/L (ref 135–145)
TRIGL SERPL-MCNC: 115 MG/DL (ref 0–149)
TSH SERPL DL<=0.005 MIU/L-ACNC: 0.7 UIU/ML (ref 0.38–5.33)
WBC # BLD AUTO: 8.6 K/UL (ref 4.8–10.8)

## 2025-04-04 PROCEDURE — 80061 LIPID PANEL: CPT

## 2025-04-04 PROCEDURE — 82570 ASSAY OF URINE CREATININE: CPT

## 2025-04-04 PROCEDURE — 36415 COLL VENOUS BLD VENIPUNCTURE: CPT

## 2025-04-04 PROCEDURE — 85025 COMPLETE CBC W/AUTO DIFF WBC: CPT

## 2025-04-04 PROCEDURE — 84443 ASSAY THYROID STIM HORMONE: CPT

## 2025-04-04 PROCEDURE — 80053 COMPREHEN METABOLIC PANEL: CPT

## 2025-04-04 PROCEDURE — 82043 UR ALBUMIN QUANTITATIVE: CPT

## 2025-04-04 PROCEDURE — 83036 HEMOGLOBIN GLYCOSYLATED A1C: CPT

## 2025-04-05 LAB
CREAT UR-MCNC: 148 MG/DL
MICROALBUMIN UR-MCNC: <1.2 MG/DL
MICROALBUMIN/CREAT UR: NORMAL MG/G (ref 0–30)

## 2025-04-10 ENCOUNTER — OFFICE VISIT (OUTPATIENT)
Dept: MEDICAL GROUP | Facility: PHYSICIAN GROUP | Age: 80
End: 2025-04-10
Payer: MEDICARE

## 2025-04-10 VITALS
HEART RATE: 74 BPM | SYSTOLIC BLOOD PRESSURE: 122 MMHG | HEIGHT: 70 IN | DIASTOLIC BLOOD PRESSURE: 72 MMHG | BODY MASS INDEX: 27.06 KG/M2 | WEIGHT: 189 LBS | TEMPERATURE: 96.8 F | OXYGEN SATURATION: 94 %

## 2025-04-10 DIAGNOSIS — R09.82 POSTNASAL DRIP: ICD-10-CM

## 2025-04-10 DIAGNOSIS — E11.22 TYPE 2 DIABETES MELLITUS WITH STAGE 3A CHRONIC KIDNEY DISEASE, WITHOUT LONG-TERM CURRENT USE OF INSULIN (HCC): ICD-10-CM

## 2025-04-10 DIAGNOSIS — E78.5 DYSLIPIDEMIA: Chronic | ICD-10-CM

## 2025-04-10 DIAGNOSIS — J01.00 ACUTE MAXILLARY SINUSITIS, RECURRENCE NOT SPECIFIED: ICD-10-CM

## 2025-04-10 DIAGNOSIS — E03.9 HYPOTHYROIDISM, UNSPECIFIED TYPE: ICD-10-CM

## 2025-04-10 DIAGNOSIS — N18.31 TYPE 2 DIABETES MELLITUS WITH STAGE 3A CHRONIC KIDNEY DISEASE, WITHOUT LONG-TERM CURRENT USE OF INSULIN (HCC): ICD-10-CM

## 2025-04-10 PROCEDURE — 99215 OFFICE O/P EST HI 40 MIN: CPT | Performed by: STUDENT IN AN ORGANIZED HEALTH CARE EDUCATION/TRAINING PROGRAM

## 2025-04-10 PROCEDURE — 3078F DIAST BP <80 MM HG: CPT | Performed by: STUDENT IN AN ORGANIZED HEALTH CARE EDUCATION/TRAINING PROGRAM

## 2025-04-10 PROCEDURE — 3074F SYST BP LT 130 MM HG: CPT | Performed by: STUDENT IN AN ORGANIZED HEALTH CARE EDUCATION/TRAINING PROGRAM

## 2025-04-10 RX ORDER — AVOBENZONE, HOMOSALATE, OCTISALATE, OCTOCRYLENE 30; 40; 45; 26 MG/ML; MG/ML; MG/ML; MG/ML
CREAM TOPICAL
Qty: 100 EACH | Refills: 3 | Status: SHIPPED | OUTPATIENT
Start: 2025-04-10

## 2025-04-10 RX ORDER — GLYBURIDE 1.25 MG/1
1.25 TABLET ORAL DAILY
Qty: 100 TABLET | Refills: 3 | Status: SHIPPED | OUTPATIENT
Start: 2025-04-10 | End: 2026-05-15

## 2025-04-10 RX ORDER — FLUTICASONE PROPIONATE 50 MCG
2 SPRAY, SUSPENSION (ML) NASAL
Qty: 16 G | Refills: 0 | Status: SHIPPED | OUTPATIENT
Start: 2025-04-10

## 2025-04-10 RX ORDER — AZITHROMYCIN 250 MG/1
TABLET, FILM COATED ORAL
Qty: 6 TABLET | Refills: 0 | Status: SHIPPED | OUTPATIENT
Start: 2025-04-10 | End: 2025-04-15

## 2025-04-10 ASSESSMENT — ENCOUNTER SYMPTOMS
SHORTNESS OF BREATH: 0
PALPITATIONS: 0
CHILLS: 0
FEVER: 0

## 2025-04-10 ASSESSMENT — FIBROSIS 4 INDEX: FIB4 SCORE: 1.509345884812358045

## 2025-04-10 NOTE — PROGRESS NOTES
Subjective:   Verbal consent was acquired by the patient to use [employer] ambient listening note generation during this visit Yes     CC: Follow-up on lab results    History of Present Illness  Ms. Baldwin is a pleasant 79-year-old established patient of [facility] who presents for lab review, diabetes mellitus, chronic kidney disease, goiter, sleep disturbance, acute bronchitis, and medication management.    She has been experiencing elevated A1c levels since her relocation to [place]. Initially, she was on glimepiride, which maintained her A1c at 6.3. However, Dr. White transitioned her to metformin over a year ago, leading to an increase in her A1c levels, ranging from 6.8 to 7.1. Despite a recent doubling of her metformin dosage to two tablets daily three months ago, there has been no significant improvement in her A1c levels. She expresses a desire to revert to glimepiride, believing it provided better control over her blood sugar levels. She also reports that her blood sugar levels have reached 200 while on metformin. She is currently taking metformin and requires refills for her lancets and test strips.    She was previously informed of potential kidney issues and was referred to a nephrologist. The nephrologist reported that her kidneys were functioning at 50%, which is considered normal for her age. She underwent another test but is unaware of the results. She has discontinued ibuprofen and is currently taking Tylenol PM and Benadryl. She is also on spironolactone, taking half a pill daily.    She has a history of goiter and reports that it appears more prominent than usual. She has not had an ultrasound in over a year.    She has been using a neti pot and suspects a sinus infection. She reports feeling unwell for the past two days. She experienced a cold last week, characterized by excessive mucus production, coughing, sore throat, runny nose, watery eyes, and ear pain. She managed these symptoms with  "hydrogen peroxide gargles and ear drops, which provided some relief. However, she continues to experience a runny nose and productive cough, initially with yellow sputum that has since lightened in color. She also reports an itchy esophagus and severe postnasal drip.    She has never smoked and used to wear a mask for sleep apnea but stopped due to issues with the mask.    She takes gummy vitamins.    SOCIAL HISTORY  She has never smoked in her life.    MEDICATIONS  Current: Metformin, Tylenol PM, Benadryl, spironolactone  Past: Glimepiride    Patient Active Problem List    Diagnosis Date Noted    Seasonal allergies 12/12/2024    Obesity (BMI 30-39.9) 12/12/2024    Chronic heart failure with preserved ejection fraction (HCC) 03/26/2024    Type 2 diabetes mellitus with kidney complication, without long-term current use of insulin (HCC) 03/25/2024    Other hemorrhoids 03/25/2024    Hx of nonmelanoma skin cancer 03/25/2024    Hypothyroidism 04/07/2016    Dyslipidemia 05/10/2012    Simple goiter 02/06/2012    Arthritis 11/23/2011    Exudative macular degeneration (HCC) 11/23/2011         Health Maintenance: Completed    ROS:  Review of Systems   Constitutional:  Negative for chills and fever.   Respiratory:  Negative for shortness of breath.    Cardiovascular:  Negative for chest pain and palpitations.       Objective:     Exam:  /72 (BP Location: Right arm, Patient Position: Sitting, BP Cuff Size: Adult)   Pulse 74   Temp 36 °C (96.8 °F) (Temporal)   Ht 1.778 m (5' 10\")   Wt 85.7 kg (189 lb)   SpO2 94%   BMI 27.12 kg/m²  Body mass index is 27.12 kg/m².    Physical Exam  Constitutional:       Appearance: Normal appearance.   Neck:      Thyroid: No thyroid mass, thyromegaly or thyroid tenderness.   Cardiovascular:      Rate and Rhythm: Normal rate and regular rhythm.      Heart sounds: Normal heart sounds.   Pulmonary:      Effort: Pulmonary effort is normal. No respiratory distress.      Breath sounds: " Normal breath sounds. No stridor. No wheezing, rhonchi or rales.   Neurological:      Mental Status: She is alert.             Labs:    Latest Reference Range & Units 04/04/25 12:42   WBC 4.8 - 10.8 K/uL 8.6   RBC 4.20 - 5.40 M/uL 4.86   Hemoglobin 12.0 - 16.0 g/dL 15.5   Hematocrit 37.0 - 47.0 % 48.1 (H)   MCV 81.4 - 97.8 fL 99.0 (H)   MCH 27.0 - 33.0 pg 31.9   MCHC 32.2 - 35.5 g/dL 32.2   RDW 35.9 - 50.0 fL 49.1   Platelet Count 164 - 446 K/uL 264   MPV 9.0 - 12.9 fL 10.3   Neutrophils-Polys 44.00 - 72.00 % 61.20   Neutrophils (Absolute) 1.82 - 7.42 K/uL 5.27   Lymphocytes 22.00 - 41.00 % 26.00   Lymphs (Absolute) 1.00 - 4.80 K/uL 2.24   Monocytes 0.00 - 13.40 % 9.20   Monos (Absolute) 0.00 - 0.85 K/uL 0.79   Eosinophils 0.00 - 6.90 % 2.80   Eos (Absolute) 0.00 - 0.51 K/uL 0.24   Basophils 0.00 - 1.80 % 0.60   Baso (Absolute) 0.00 - 0.12 K/uL 0.05   Immature Granulocytes 0.00 - 0.90 % 0.20   Immature Granulocytes (abs) 0.00 - 0.11 K/uL 0.02   Nucleated RBC 0.00 - 0.20 /100 WBC 0.00   NRBC (Absolute) K/uL 0.00   Sodium 135 - 145 mmol/L 140   Potassium 3.6 - 5.5 mmol/L 4.8   Chloride 96 - 112 mmol/L 103   Co2 20 - 33 mmol/L 27   Anion Gap 7.0 - 16.0  10.0   Glucose 65 - 99 mg/dL 133 (H)   Bun 8 - 22 mg/dL 26 (H)   Creatinine 0.50 - 1.40 mg/dL 1.07   GFR (CKD-EPI) >60 mL/min/1.73 m 2 52 !   Calcium 8.5 - 10.5 mg/dL 9.5   Correct Calcium 8.5 - 10.5 mg/dL 9.4   AST(SGOT) 12 - 45 U/L 22   ALT(SGPT) 2 - 50 U/L 20   Alkaline Phosphatase 30 - 99 U/L 57   Total Bilirubin 0.1 - 1.5 mg/dL 0.5   Albumin 3.2 - 4.9 g/dL 4.1   Total Protein 6.0 - 8.2 g/dL 7.0   Globulin 1.9 - 3.5 g/dL 2.9   A-G Ratio g/dL 1.4   Glycohemoglobin 4.0 - 5.6 % 7.0 (H)   Estim. Avg Glu mg/dL 154   Fasting Status  Fasting   Cholesterol,Tot 100 - 199 mg/dL 146   Triglycerides 0 - 149 mg/dL 115   HDL >=40 mg/dL 55   LDL <100 mg/dL 68   TSH 0.380 - 5.330 uIU/mL 0.705   (H): Data is abnormally high  !: Data is abnormal    Assessment & Plan:     79 y.o.  female with the following -     1. Type 2 diabetes mellitus with stage 3a chronic kidney disease, without long-term current use of insulin (HCC)  Chronic, controlled.  A1c is 7.0.  Per the American geriatric Society they recommend a goal A1c of 7.5 to 8% in elderly patients.  Patient previously was on glyburide1.25 mg daily and per patient her A1c was well-controlled and was 7%. patient states that she was switched to metformin by her previous PCP.  Patient would like to restart glyburide 1.25 mg daily.  At this time we will discontinue metformin and start patient glyburide 1.25mg qd.  - glyBURIDE (DIABETA) 1.25 MG tablet; Take 1 Tablet by mouth every day.  Dispense: 100 Tablet; Refill: 3  - Lancets; Lancets order: Lancets for One Touch Verio Flex meter. Sig: use daily and prn ssx high or low sugar. #100 RF x 3  Dispense: 100 Each; Refill: 3  - Blood Glucose Test Strips; Use one One Touch Verio Flex strip to test blood sugar once daily .  Dispense: 100 Strip; Refill: 3    2. Postnasal drip  Acute, ongoing.  Patient was advised to continue to use her Mirna pot and to use Flonase at bedtime.  - fluticasone (FLONASE) 50 MCG/ACT nasal spray; Administer 2 Sprays into affected nostril(S) at bedtime.  Dispense: 16 g; Refill: 0    3. Acute maxillary sinusitis, recurrence not specified  Acute, ongoing.  Likely viral in etiology.  I advised the patient to use Flonase at bedtime and to continue to use her Pomaria pot.  If patient's symptoms do not resolve in the next 3 to 5 days then she can start taking the Z-Thad.  Patient verbalized understanding.  - azithromycin (ZITHROMAX) 250 MG Tab; Take 2 Tablets by mouth every day for 1 day, THEN 1 Tablet every day for 4 days.  Dispense: 6 Tablet; Refill: 0    4. Dyslipidemia  Chronic, controlled.    5. Hypothyroidism, unspecified type  Chronic, controlled. Continue Synthroid 100mcg qd.          I spent a total of 40 minutes with record review, exam, communication with the patient,  communication with other providers, and documentation of this encounter.      Return in about 3 months (around 7/10/2025), or if symptoms worsen or fail to improve, for DM.    Please note that this dictation was created using voice recognition software. I have made every reasonable attempt to correct obvious errors, but I expect that there are errors of grammar and possibly content that I did not discover before finalizing the note.

## 2025-04-17 ENCOUNTER — RESULTS FOLLOW-UP (OUTPATIENT)
Dept: MEDICAL GROUP | Facility: PHYSICIAN GROUP | Age: 80
End: 2025-04-17

## 2025-05-05 DIAGNOSIS — N18.31 TYPE 2 DIABETES MELLITUS WITH STAGE 3A CHRONIC KIDNEY DISEASE, WITHOUT LONG-TERM CURRENT USE OF INSULIN (HCC): ICD-10-CM

## 2025-05-05 DIAGNOSIS — E11.22 TYPE 2 DIABETES MELLITUS WITH STAGE 3A CHRONIC KIDNEY DISEASE, WITHOUT LONG-TERM CURRENT USE OF INSULIN (HCC): ICD-10-CM

## 2025-05-05 DIAGNOSIS — E03.9 HYPOTHYROIDISM, UNSPECIFIED TYPE: ICD-10-CM

## 2025-05-05 RX ORDER — AVOBENZONE, HOMOSALATE, OCTISALATE, OCTOCRYLENE 30; 40; 45; 26 MG/ML; MG/ML; MG/ML; MG/ML
CREAM TOPICAL
Qty: 100 EACH | Refills: 3 | Status: SHIPPED | OUTPATIENT
Start: 2025-05-05

## 2025-05-05 RX ORDER — LEVOTHYROXINE SODIUM 100 MCG
100 TABLET ORAL
Qty: 30 TABLET | Refills: 0 | Status: SHIPPED | OUTPATIENT
Start: 2025-05-05 | End: 2025-05-13 | Stop reason: SDUPTHER

## 2025-05-06 NOTE — TELEPHONE ENCOUNTER
Requested Prescriptions     Signed Prescriptions Disp Refills    SYNTHROID 100 MCG Tab 30 Tablet 0     Sig: Take 1 Tablet by mouth every morning on an empty stomach.     Authorizing Provider: SONAM MAYORGA 100 Each 3     Sig: Lancets order: Lancets for One Touch Verio Flex meter. Sig: use daily and prn ssx high or low sugar. #100 RF x 3     Authorizing Provider: SONAM MAYORGA A.P.R.N.

## 2025-05-06 NOTE — TELEPHONE ENCOUNTER
Received request via: Patient    Was the patient seen in the last year in this department? Yes    Does the patient have an active prescription (recently filled or refills available) for medication(s) requested? No    Pharmacy Name: [facility] [address]     Does the patient have residential Plus and need 100-day supply? (This applies to ALL medications) Patient does not have SCP

## 2025-05-13 ENCOUNTER — TELEPHONE (OUTPATIENT)
Dept: MEDICAL GROUP | Facility: PHYSICIAN GROUP | Age: 80
End: 2025-05-13
Payer: MEDICARE

## 2025-05-13 DIAGNOSIS — E03.9 HYPOTHYROIDISM, UNSPECIFIED TYPE: ICD-10-CM

## 2025-05-13 RX ORDER — LEVOTHYROXINE SODIUM 100 UG/1
100 TABLET ORAL
Qty: 90 TABLET | Refills: 3 | Status: SHIPPED | OUTPATIENT
Start: 2025-05-13

## 2025-05-13 NOTE — TELEPHONE ENCOUNTER
Called pt back letting her know levothyroxine refill was sent. Pt brought up issue with lancet prescription stating she needs them changed to accu-check or freestyle, Pt will send print out of letter from insurance regarding exactly what prescription has to say for lancets, pt does not need refill at this time of the meter or strips  but will reach out when she does.  Pt also said she think she might have strep for the past couple months, advised appt, pt declined stating only thing bothering her currently was the neck pain, I again advised appt to have this checked by a provider pt declined due having to check with ride first. She will call back if she wants appt.

## 2025-05-13 NOTE — TELEPHONE ENCOUNTER
Received request via: Patient    Was the patient seen in the last year in this department? Yes    Does the patient have an active prescription (recently filled or refills available) for medication(s) requested? No    [facility]    Does the patient have half-way Plus and need 100-day supply? (This applies to ALL medications) Patient does not have SCP

## 2025-05-13 NOTE — TELEPHONE ENCOUNTER
Requested Prescriptions     Signed Prescriptions Disp Refills    levothyroxine (SYNTHROID) 100 MCG Tab 90 Tablet 3     Sig: Take 1 Tablet by mouth every morning on an empty stomach.     Authorizing Provider: SONAM MAYORGA     requesting generic levothyroxine and refill to mail order.    GRAHAM Ludwig.

## 2025-05-21 ENCOUNTER — TELEPHONE (OUTPATIENT)
Dept: MEDICAL GROUP | Facility: PHYSICIAN GROUP | Age: 80
End: 2025-05-21
Payer: MEDICARE

## 2025-05-22 NOTE — TELEPHONE ENCOUNTER
Pt was switched from Metformin to Glyburide per her request at her last appt on 4/10/25. Pts states since she has been switched her BS have 199-228. She is not sure if she should go back to Metformin or what she should do next. She would like a call back.

## 2025-05-23 NOTE — TELEPHONE ENCOUNTER
Message reviewed.  Increase her glyburide to 2.5 mg daily.  Continue to watch blood sugars.  Reach out next week for follow-up.

## 2025-05-23 NOTE — TELEPHONE ENCOUNTER
Spoke with pt about increase she was okay with it and will records her BS numbers and let pt know ill reach out next week to see how she is doing.

## 2025-07-10 ENCOUNTER — HOSPITAL ENCOUNTER (OUTPATIENT)
Dept: RADIOLOGY | Facility: MEDICAL CENTER | Age: 80
End: 2025-07-10
Attending: NURSE PRACTITIONER
Payer: MEDICARE

## 2025-07-10 ENCOUNTER — HOSPITAL ENCOUNTER (OUTPATIENT)
Facility: MEDICAL CENTER | Age: 80
End: 2025-07-10
Attending: NURSE PRACTITIONER
Payer: MEDICARE

## 2025-07-10 ENCOUNTER — OFFICE VISIT (OUTPATIENT)
Dept: MEDICAL GROUP | Facility: PHYSICIAN GROUP | Age: 80
End: 2025-07-10
Payer: MEDICARE

## 2025-07-10 VITALS
HEIGHT: 70 IN | BODY MASS INDEX: 30.49 KG/M2 | SYSTOLIC BLOOD PRESSURE: 116 MMHG | TEMPERATURE: 97.4 F | DIASTOLIC BLOOD PRESSURE: 60 MMHG | HEART RATE: 87 BPM | OXYGEN SATURATION: 96 % | WEIGHT: 213 LBS

## 2025-07-10 DIAGNOSIS — I50.32 CHRONIC HEART FAILURE WITH PRESERVED EJECTION FRACTION (HCC): ICD-10-CM

## 2025-07-10 DIAGNOSIS — M54.2 NECK PAIN, ACUTE: ICD-10-CM

## 2025-07-10 DIAGNOSIS — R07.0 THROAT PAIN: ICD-10-CM

## 2025-07-10 DIAGNOSIS — E11.22 TYPE 2 DIABETES MELLITUS WITH STAGE 3A CHRONIC KIDNEY DISEASE, WITHOUT LONG-TERM CURRENT USE OF INSULIN (HCC): Primary | ICD-10-CM

## 2025-07-10 DIAGNOSIS — N18.31 TYPE 2 DIABETES MELLITUS WITH STAGE 3A CHRONIC KIDNEY DISEASE, WITHOUT LONG-TERM CURRENT USE OF INSULIN (HCC): Primary | ICD-10-CM

## 2025-07-10 PROBLEM — M54.16 RADICULOPATHY, LUMBAR REGION: Status: ACTIVE | Noted: 2024-06-25

## 2025-07-10 PROBLEM — G47.33 OBSTRUCTIVE SLEEP APNEA HYPOPNEA, SEVERE: Status: ACTIVE | Noted: 2019-09-20

## 2025-07-10 PROBLEM — N28.1 RENAL CYST, ACQUIRED, RIGHT: Status: ACTIVE | Noted: 2023-10-30

## 2025-07-10 PROBLEM — M50.30 DEGENERATION OF CERVICAL INTERVERTEBRAL DISC: Status: ACTIVE | Noted: 2021-04-05

## 2025-07-10 LAB
HBA1C MFR BLD: 6.6 % (ref ?–5.8)
POCT INT CON NEG: NEGATIVE
POCT INT CON POS: POSITIVE

## 2025-07-10 PROCEDURE — 72040 X-RAY EXAM NECK SPINE 2-3 VW: CPT

## 2025-07-10 PROCEDURE — 3078F DIAST BP <80 MM HG: CPT | Performed by: NURSE PRACTITIONER

## 2025-07-10 PROCEDURE — 99214 OFFICE O/P EST MOD 30 MIN: CPT | Performed by: NURSE PRACTITIONER

## 2025-07-10 PROCEDURE — 83036 HEMOGLOBIN GLYCOSYLATED A1C: CPT | Performed by: NURSE PRACTITIONER

## 2025-07-10 PROCEDURE — 3074F SYST BP LT 130 MM HG: CPT | Performed by: NURSE PRACTITIONER

## 2025-07-10 PROCEDURE — 87070 CULTURE OTHR SPECIMN AEROBIC: CPT

## 2025-07-10 RX ORDER — GLUCOSAMINE/CHONDR SU A SOD 750-600 MG
TABLET ORAL
COMMUNITY

## 2025-07-10 ASSESSMENT — FIBROSIS 4 INDEX: FIB4 SCORE: 1.509345884812358045

## 2025-07-10 ASSESSMENT — PATIENT HEALTH QUESTIONNAIRE - PHQ9: CLINICAL INTERPRETATION OF PHQ2 SCORE: 0

## 2025-07-11 DIAGNOSIS — R07.0 THROAT PAIN: ICD-10-CM

## 2025-07-11 NOTE — ASSESSMENT & PLAN NOTE
Previous A1C 7.0%.  She was previously on metformin but experienced hyperglycemia and had requested to switch back to glyburide 1.25 mg daily April 2025.  Her sugars had been 199-228 and we had increased her glimepiride to 2.5 mg daily.  With the dose increase blood sugars were 140-200. Blood sugars continue 103, 183, 152,182,157,189, 126. Fasting 130-160, 180-190 after meals. A1c today 6.6%. Referral to nephrology in future if CKD worsens.

## 2025-07-11 NOTE — PROGRESS NOTES
"Subjective:     CC: Neck pain    HPI:   Elissa presents today with her son Boone for the following:    Neck pain  Neck pain for 2.5 months. The pain goes down from ears and radiates down front and back. The pain is worse when rotating head left, right and back. Worsened over the last 3 weeks. The pain is relived by sitting and leaning back. Aspercreame is helping.  No falls, trauma, whip lash. 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  Neck does not hurt in the bone but more on her skin, glands and muscle. Notes she had exposure to strep around 1 month ago in [place]. Does not feel that the hydrogen peroxide gargles helped clear. She has concerns for strep. She does have sinus drainage cloudy in color. Subjective fever for the last month and ear pain. No sore throat or chills.    Type 2 diabetes mellitus with kidney complication, without long-term current use of insulin ([facility])  Previous A1C 7.0%.  She was previously on metformin but experienced hyperglycemia and had requested to switch back to glyburide 1.25 mg daily April 2025.  Her sugars had been 199-228 and we had increased her glimepiride to 2.5 mg daily.  With the dose increase blood sugars were 140-200. Blood sugars continue 103, 183, 152,182,157,189, 126. Fasting 130-160, 180-190 after meals. A1c today 6.6%. Referral to nephrology in future if CKD worsens.     Chronic heart failure with preserved ejection fraction (HCC)  BP today 150/80.  Continue spironolactone 12.5 mg daily at bedtime. Discussed to change dosing to day dosing. Repeat /60. History of hypertension.       Past Medical History[1]    Social History[2]    Current Medications and Prescriptions Ordered in Epic[3]    Allergies:  Pcn [penicillins]    Health Maintenance: Reviewed       Objective:     Vital signs reviewed  Exam:  /60 (BP Location: Right arm, Patient Position: Sitting, BP Cuff Size: Adult)   Pulse 87   Temp 36.3 °C (97.4 °F) (Temporal)   Ht 1.778 m (5' 10\")  "  Wt 96.6 kg (213 lb)   SpO2 96%   BMI 30.56 kg/m²  Body mass index is 30.56 kg/m².    General: Normal appearing. No distress.  HENT: Normocephalic. Ears normal shape and contour, canals are clear bilaterally, tympanic membranes are benign, nasal mucosa benign, oropharynx is without erythema, edema or exudates. Pain to right and left frontal sinus, no pain to bilateral maxillary sinuses.   Eyes: Eyes conjunctiva clear lids without ptosis, lids normal.  Pulmonary: Clear to ausculation.  Normal effort. No rales, rhonchi, or wheezing.  Cardiovascular: Regular rate and rhythm without murmur.   Lymph: No cervical or supraclavicular lymph nodes are palpable.  Skin: Warm and dry.  No obvious lesions.  Psych: Normal mood and affect. Alert and oriented x3. Judgment and insight is normal.      Monofilament testing with a 10 gram force: sensation intact: decreased on left  Visual Inspection: Feet without maceration, ulcers, fissures.  Pedal pulses: intact bilaterally    Labs:      Latest Reference Range & Units 07/10/25 18:05   Glycohemoglobin <=5.8 % 6.6 !   !: Data is abnormal    Assessment & Plan:     79 y.o. female with the following -     1. Chronic heart failure with preserved ejection fraction (HCC)  Chronic stable problem. Continue spironolactone 12.5 mg daily in the morning.     2. Type 2 diabetes mellitus with stage 3a chronic kidney disease, without long-term current use of insulin (HCC) (Primary)  Chronic stable problem. Continue glyburide 2.5 mg daily. Continue home blood sugar monitoring. A1C every 6 months.   - POCT  A1C    3. Neck pain, acute  Acute uncomplicated problem. Given her past surgery check updated imaging.   - DX-CERVICAL SPINE-2 OR 3 VIEWS; Future    4. Throat pain  Acute uncomplicated problem. Check throat culture and antibiotics pending culture.   - CULTURE THROAT; Future      Return in about 3 months (around 10/10/2025) for Multiple Issues.    Please note that this dictation was created using  voice recognition software. I have made every reasonable attempt to correct obvious errors, but I expect that there are errors of grammar and possibly content that I did not discover before finalizing the note.             [1]   Past Medical History:  Diagnosis Date    Arthritis 11/23/2011    Cough 5/10/2012    Dyslipidemia 5/10/2012    Exudative macular degeneration (HCC) 11/23/2011    HTN (hypertension) 5/10/2012    Obesity 11/23/2011    Palpitations 5/10/2012    Simple goiter 2/6/2012   [2]   Social History  Tobacco Use    Smoking status: Never    Smokeless tobacco: Never   Vaping Use    Vaping status: Never Used   Substance Use Topics    Alcohol use: Never    Drug use: Never   [3]   Current Outpatient Medications Ordered in Epic   Medication Sig Dispense Refill    Calcium 600 MG Tab Take  by mouth.      Lutein-Zeaxanthin 25-5 MG Cap Take  by mouth.      MELATONIN PO Take  by mouth.      glyBURIDE (DIABETA) 2.5 MG Tab Take 1 Tablet by mouth every day. 100 Tablet 3    levothyroxine (SYNTHROID) 100 MCG Tab Take 1 Tablet by mouth every morning on an empty stomach. 90 Tablet 3    Lancets Lancets order: Lancets for One Touch Verio Flex meter. Sig: use daily and prn ssx high or low sugar. #100 RF x 3 100 Each 3    fluticasone (FLONASE) 50 MCG/ACT nasal spray Administer 2 Sprays into affected nostril(S) at bedtime. 16 g 0    Blood Glucose Test Strips Use one One Touch Verio Flex strip to test blood sugar once daily . 100 Strip 3    pravastatin (PRAVACHOL) 80 MG tablet Take 1 Tablet by mouth every day. 100 Tablet 3    spironolactone (ALDACTONE) 25 MG Tab Take 0.5 Tablets by mouth every day. 90 Tablet 3    aspirin 81 MG EC tablet Take 81 mg by mouth every day.      Multiple Vitamins-Minerals (PRESERVISION AREDS 2) Cap Take 2 Capsules by mouth every day.      Daily Multiple Vitamins TABS Take 1 Tab by mouth every day.        vitamin D (CHOLECALCIFEROL) 1000 UNIT TABS Take  by mouth. TAKE AS DIRECTED.        diphenhydrAMINE-APAP, sleep, (TYLENOL PM EXTRA STRENGTH PO) Take  by mouth. Nightly (Patient not taking: Reported on 7/10/2025)      diphenhydrAMINE HCl (BENADRYL ALLERGY PO) Take  by mouth. 2 at night (Patient not taking: Reported on 7/10/2025)      polyethylene glycol/lytes (MIRALAX) Pack Take 1 Packet by mouth every day. For constipation (Patient not taking: Reported on 7/10/2025) 72 Each 3     No current Epic-ordered facility-administered medications on file.

## 2025-07-11 NOTE — ASSESSMENT & PLAN NOTE
BP today 150/80.  Continue spironolactone 12.5 mg daily at bedtime. Discussed to change dosing to day dosing. Repeat /60. History of hypertension.

## 2025-07-13 LAB
BACTERIA SPEC RESP CULT: NORMAL
SIGNIFICANT IND 70042: NORMAL
SITE SITE: NORMAL
SOURCE SOURCE: NORMAL

## 2025-08-01 DIAGNOSIS — N18.31 TYPE 2 DIABETES MELLITUS WITH STAGE 3A CHRONIC KIDNEY DISEASE, WITHOUT LONG-TERM CURRENT USE OF INSULIN (HCC): ICD-10-CM

## 2025-08-01 DIAGNOSIS — E11.22 TYPE 2 DIABETES MELLITUS WITH STAGE 3A CHRONIC KIDNEY DISEASE, WITHOUT LONG-TERM CURRENT USE OF INSULIN (HCC): ICD-10-CM

## 2025-08-01 RX ORDER — AVOBENZONE, HOMOSALATE, OCTISALATE, OCTOCRYLENE 30; 40; 45; 26 MG/ML; MG/ML; MG/ML; MG/ML
CREAM TOPICAL
Qty: 100 EACH | Refills: 0 | Status: CANCELLED | OUTPATIENT
Start: 2025-08-01

## 2025-08-04 RX ORDER — AVOBENZONE, HOMOSALATE, OCTISALATE, OCTOCRYLENE 30; 40; 45; 26 MG/ML; MG/ML; MG/ML; MG/ML
CREAM TOPICAL
Qty: 200 EACH | Refills: 3 | Status: SHIPPED | OUTPATIENT
Start: 2025-08-04